# Patient Record
Sex: MALE | Race: WHITE | Employment: OTHER | URBAN - METROPOLITAN AREA
[De-identification: names, ages, dates, MRNs, and addresses within clinical notes are randomized per-mention and may not be internally consistent; named-entity substitution may affect disease eponyms.]

---

## 2017-02-04 ENCOUNTER — LAB ENCOUNTER (OUTPATIENT)
Dept: LAB | Facility: HOSPITAL | Age: 67
End: 2017-02-04
Attending: INTERNAL MEDICINE
Payer: COMMERCIAL

## 2017-02-04 ENCOUNTER — PRIOR ORIGINAL RECORDS (OUTPATIENT)
Dept: OTHER | Age: 67
End: 2017-02-04

## 2017-02-04 DIAGNOSIS — Z00.00 ROUTINE PHYSICAL EXAMINATION: ICD-10-CM

## 2017-02-04 LAB
25-HYDROXYVITAMIN D (TOTAL): 49.8 NG/ML (ref 30–100)
ALBUMIN SERPL-MCNC: 3.3 G/DL (ref 3.5–4.8)
ALP LIVER SERPL-CCNC: 78 U/L (ref 45–117)
ALT SERPL-CCNC: 29 U/L (ref 17–63)
AST SERPL-CCNC: 24 U/L (ref 15–41)
BASOPHILS # BLD AUTO: 0.05 X10(3) UL (ref 0–0.1)
BASOPHILS NFR BLD AUTO: 1.1 %
BILIRUB SERPL-MCNC: 0.8 MG/DL (ref 0.1–2)
BUN BLD-MCNC: 15 MG/DL (ref 8–20)
CALCIUM BLD-MCNC: 8.9 MG/DL (ref 8.3–10.3)
CHLORIDE: 107 MMOL/L (ref 101–111)
CHOLEST SMN-MCNC: 136 MG/DL (ref ?–200)
CO2: 30 MMOL/L (ref 22–32)
COMPLEXED PSA SERPL-MCNC: <0.01 NG/ML (ref 0.01–4)
CREAT BLD-MCNC: 1.03 MG/DL (ref 0.7–1.3)
EOSINOPHIL # BLD AUTO: 0.38 X10(3) UL (ref 0–0.3)
EOSINOPHIL NFR BLD AUTO: 8.2 %
ERYTHROCYTE [DISTWIDTH] IN BLOOD BY AUTOMATED COUNT: 13.8 % (ref 11.5–16)
FREE T4: 0.9 NG/DL (ref 0.9–1.8)
GLUCOSE BLD-MCNC: 96 MG/DL (ref 70–99)
HCT VFR BLD AUTO: 42.9 % (ref 37–53)
HDLC SERPL-MCNC: 47 MG/DL (ref 45–?)
HDLC SERPL: 2.89 {RATIO} (ref ?–4.97)
HGB BLD-MCNC: 14.1 G/DL (ref 13–17)
IMMATURE GRANULOCYTE COUNT: 0 X10(3) UL (ref 0–1)
IMMATURE GRANULOCYTE RATIO %: 0 %
LDLC SERPL CALC-MCNC: 77 MG/DL (ref ?–130)
LYMPHOCYTES # BLD AUTO: 1.08 X10(3) UL (ref 0.9–4)
LYMPHOCYTES NFR BLD AUTO: 23.3 %
M PROTEIN MFR SERPL ELPH: 6.8 G/DL (ref 6.1–8.3)
MCH RBC QN AUTO: 28.8 PG (ref 27–33.2)
MCHC RBC AUTO-ENTMCNC: 32.9 G/DL (ref 31–37)
MCV RBC AUTO: 87.6 FL (ref 80–99)
MONOCYTES # BLD AUTO: 0.54 X10(3) UL (ref 0.1–0.6)
MONOCYTES NFR BLD AUTO: 11.6 %
NEUTROPHIL ABS PRELIM: 2.59 X10 (3) UL (ref 1.3–6.7)
NEUTROPHILS # BLD AUTO: 2.59 X10(3) UL (ref 1.3–6.7)
NEUTROPHILS NFR BLD AUTO: 55.8 %
NONHDLC SERPL-MCNC: 89 MG/DL (ref ?–130)
PLATELET # BLD AUTO: 203 10(3)UL (ref 150–450)
POTASSIUM SERPL-SCNC: 4.2 MMOL/L (ref 3.6–5.1)
RBC # BLD AUTO: 4.9 X10(6)UL (ref 3.8–5.8)
RED CELL DISTRIBUTION WIDTH-SD: 44.4 FL (ref 35.1–46.3)
SODIUM SERPL-SCNC: 141 MMOL/L (ref 136–144)
TRIGLYCERIDES: 62 MG/DL (ref ?–150)
TSI SER-ACNC: 1.75 MIU/ML (ref 0.35–5.5)
VLDL: 12 MG/DL (ref 5–40)
WBC # BLD AUTO: 4.6 X10(3) UL (ref 4–13)

## 2017-02-04 PROCEDURE — 84439 ASSAY OF FREE THYROXINE: CPT

## 2017-02-04 PROCEDURE — 80053 COMPREHEN METABOLIC PANEL: CPT

## 2017-02-04 PROCEDURE — 82306 VITAMIN D 25 HYDROXY: CPT

## 2017-02-04 PROCEDURE — 84443 ASSAY THYROID STIM HORMONE: CPT

## 2017-02-04 PROCEDURE — 80061 LIPID PANEL: CPT

## 2017-02-04 PROCEDURE — 85025 COMPLETE CBC W/AUTO DIFF WBC: CPT

## 2017-02-04 PROCEDURE — 36415 COLL VENOUS BLD VENIPUNCTURE: CPT

## 2017-02-06 ENCOUNTER — PRIOR ORIGINAL RECORDS (OUTPATIENT)
Dept: OTHER | Age: 67
End: 2017-02-06

## 2017-02-06 LAB
ALBUMIN: 3.3 G/DL
ALKALINE PHOSPHATATE(ALK PHOS): 78 IU/L
ALT (SGPT): 29 U/L
AST (SGOT): 24 U/L
BILIRUBIN TOTAL: 0.8 MG/DL
BUN: 15 MG/DL
CALCIUM: 8.9 MG/DL
CHLORIDE: 107 MEQ/L
CHOLESTEROL, TOTAL: 136 MG/DL
CREATININE, SERUM: 1.03 MG/DL
GLUCOSE: 96 MG/DL
GLUCOSE: 96 MG/DL
HDL CHOLESTEROL: 47 MG/DL
LDL CHOLESTEROL: 77 MG/DL
NON-HDL CHOLESTEROL: 89 MG/DL
POTASSIUM, SERUM: 4.2 MEQ/L
PROTEIN, TOTAL: 6.8 G/DL
SGOT (AST): 24 IU/L
SGPT (ALT): 29 IU/L
SODIUM: 141 MEQ/L
TRIGLYCERIDES: 62 MG/DL

## 2017-02-08 ENCOUNTER — TELEPHONE (OUTPATIENT)
Dept: FAMILY MEDICINE CLINIC | Facility: CLINIC | Age: 67
End: 2017-02-08

## 2017-02-08 DIAGNOSIS — E78.00 PURE HYPERCHOLESTEROLEMIA: Primary | ICD-10-CM

## 2017-03-15 ENCOUNTER — PRIOR ORIGINAL RECORDS (OUTPATIENT)
Dept: OTHER | Age: 67
End: 2017-03-15

## 2017-05-09 ENCOUNTER — PRIOR ORIGINAL RECORDS (OUTPATIENT)
Dept: OTHER | Age: 67
End: 2017-05-09

## 2017-05-11 ENCOUNTER — HOSPITAL ENCOUNTER (OUTPATIENT)
Dept: CV DIAGNOSTICS | Facility: HOSPITAL | Age: 67
Discharge: HOME | End: 2017-05-11
Attending: INTERNAL MEDICINE
Payer: COMMERCIAL

## 2017-05-11 DIAGNOSIS — I25.10 CAD (CORONARY ARTERY DISEASE): ICD-10-CM

## 2017-05-11 DIAGNOSIS — R07.9 CHEST PAIN, UNSPECIFIED: ICD-10-CM

## 2017-05-11 PROCEDURE — 78452 HT MUSCLE IMAGE SPECT MULT: CPT | Performed by: INTERNAL MEDICINE

## 2017-05-11 PROCEDURE — 93017 CV STRESS TEST TRACING ONLY: CPT | Performed by: INTERNAL MEDICINE

## 2017-05-11 PROCEDURE — 93018 CV STRESS TEST I&R ONLY: CPT | Performed by: INTERNAL MEDICINE

## 2017-07-10 PROCEDURE — 88305 TISSUE EXAM BY PATHOLOGIST: CPT | Performed by: INTERNAL MEDICINE

## 2017-07-18 ENCOUNTER — PRIOR ORIGINAL RECORDS (OUTPATIENT)
Dept: OTHER | Age: 67
End: 2017-07-18

## 2017-07-28 ENCOUNTER — PRIOR ORIGINAL RECORDS (OUTPATIENT)
Dept: OTHER | Age: 67
End: 2017-07-28

## 2017-08-02 ENCOUNTER — PRIOR ORIGINAL RECORDS (OUTPATIENT)
Dept: OTHER | Age: 67
End: 2017-08-02

## 2017-12-27 ENCOUNTER — TELEPHONE (OUTPATIENT)
Dept: FAMILY MEDICINE CLINIC | Facility: CLINIC | Age: 67
End: 2017-12-27

## 2017-12-27 DIAGNOSIS — N52.31 ERECTILE DYSFUNCTION FOLLOWING RADICAL PROSTATECTOMY: ICD-10-CM

## 2017-12-27 DIAGNOSIS — C61 MALIGNANT NEOPLASM OF PROSTATE (HCC): ICD-10-CM

## 2017-12-27 RX ORDER — PROPOXYPHENE HYDROCHLORIDE AND ACETAMINOPHEN 65; 650 MG/1; MG/1
TABLET, FILM COATED ORAL
Qty: 6 EACH | Refills: 2 | Status: SHIPPED | OUTPATIENT
Start: 2017-12-27 | End: 2018-05-29

## 2017-12-27 RX ORDER — PROPOXYPHENE HYDROCHLORIDE AND ACETAMINOPHEN 65; 650 MG/1; MG/1
TABLET, FILM COATED ORAL
Qty: 6 EACH | Refills: 2 | Status: SHIPPED | OUTPATIENT
Start: 2017-12-27 | End: 2018-05-21

## 2017-12-27 NOTE — TELEPHONE ENCOUNTER
Pt states he contacted his pharmacy for a refill of MUSE and was told this   and pharmacy is faxing over a request for refill to our office. Pt relayed refill for this is for 2 different strengths: 500 mcg and 1000mcg.  Pt states he needs this r

## 2018-01-04 PROCEDURE — 82746 ASSAY OF FOLIC ACID SERUM: CPT | Performed by: INTERNAL MEDICINE

## 2018-01-04 PROCEDURE — 82607 VITAMIN B-12: CPT | Performed by: INTERNAL MEDICINE

## 2018-01-10 ENCOUNTER — PRIOR ORIGINAL RECORDS (OUTPATIENT)
Dept: OTHER | Age: 68
End: 2018-01-10

## 2018-01-11 ENCOUNTER — PRIOR ORIGINAL RECORDS (OUTPATIENT)
Dept: OTHER | Age: 68
End: 2018-01-11

## 2018-01-11 ENCOUNTER — TELEPHONE (OUTPATIENT)
Dept: FAMILY MEDICINE CLINIC | Facility: CLINIC | Age: 68
End: 2018-01-11

## 2018-01-11 NOTE — TELEPHONE ENCOUNTER
I have left detailed msg on cell letting pt know the high dose flu shots we have here (since pt over age 72) are preservative free. Asked to cb if he had further questions on this.

## 2018-01-11 NOTE — TELEPHONE ENCOUNTER
Patient is wanting to know where he can get a Flu Vaccine without preservatives. Please advise. Thank you.

## 2018-01-15 ENCOUNTER — NURSE ONLY (OUTPATIENT)
Dept: FAMILY MEDICINE CLINIC | Facility: CLINIC | Age: 68
End: 2018-01-15

## 2018-01-15 PROCEDURE — 90653 IIV ADJUVANT VACCINE IM: CPT | Performed by: FAMILY MEDICINE

## 2018-01-15 PROCEDURE — 90471 IMMUNIZATION ADMIN: CPT | Performed by: FAMILY MEDICINE

## 2018-05-19 DIAGNOSIS — N52.31 ERECTILE DYSFUNCTION FOLLOWING RADICAL PROSTATECTOMY: ICD-10-CM

## 2018-05-19 DIAGNOSIS — C61 MALIGNANT NEOPLASM OF PROSTATE (HCC): ICD-10-CM

## 2018-05-21 RX ORDER — ACEBUTOLOL HYDROCHLORIDE 400 MG/1
1000 CAPSULE ORAL AS NEEDED
Refills: 0 | OUTPATIENT
Start: 2018-05-21

## 2018-05-21 NOTE — TELEPHONE ENCOUNTER
Not a protocol medication last OV 12/20/16, last refill 12/27/17.   Please review and refill if appropriate, thank you

## 2018-05-24 ENCOUNTER — PRIOR ORIGINAL RECORDS (OUTPATIENT)
Dept: OTHER | Age: 68
End: 2018-05-24

## 2018-05-24 ENCOUNTER — LAB ENCOUNTER (OUTPATIENT)
Dept: LAB | Age: 68
End: 2018-05-24
Attending: INTERNAL MEDICINE
Payer: COMMERCIAL

## 2018-05-24 DIAGNOSIS — I25.10 CORONARY ATHEROSCLEROSIS OF NATIVE CORONARY ARTERY: ICD-10-CM

## 2018-05-24 DIAGNOSIS — E78.00 PURE HYPERCHOLESTEROLEMIA: Primary | ICD-10-CM

## 2018-05-24 PROCEDURE — 80061 LIPID PANEL: CPT

## 2018-05-24 PROCEDURE — 80053 COMPREHEN METABOLIC PANEL: CPT

## 2018-05-24 PROCEDURE — 36415 COLL VENOUS BLD VENIPUNCTURE: CPT

## 2018-05-24 PROCEDURE — 84153 ASSAY OF PSA TOTAL: CPT

## 2018-05-29 ENCOUNTER — OFFICE VISIT (OUTPATIENT)
Dept: FAMILY MEDICINE CLINIC | Facility: CLINIC | Age: 68
End: 2018-05-29

## 2018-05-29 VITALS
WEIGHT: 197 LBS | SYSTOLIC BLOOD PRESSURE: 128 MMHG | HEIGHT: 69 IN | DIASTOLIC BLOOD PRESSURE: 72 MMHG | RESPIRATION RATE: 16 BRPM | TEMPERATURE: 98 F | HEART RATE: 60 BPM | BODY MASS INDEX: 29.18 KG/M2

## 2018-05-29 DIAGNOSIS — C61 MALIGNANT NEOPLASM OF PROSTATE (HCC): ICD-10-CM

## 2018-05-29 DIAGNOSIS — Z13.89 SCREENING FOR GENITOURINARY CONDITION: ICD-10-CM

## 2018-05-29 DIAGNOSIS — I25.10 ATHEROSCLEROSIS OF NATIVE CORONARY ARTERY OF NATIVE HEART WITHOUT ANGINA PECTORIS: ICD-10-CM

## 2018-05-29 DIAGNOSIS — Z00.00 ANNUAL PHYSICAL EXAM: Primary | ICD-10-CM

## 2018-05-29 DIAGNOSIS — N52.31 ERECTILE DYSFUNCTION FOLLOWING RADICAL PROSTATECTOMY: ICD-10-CM

## 2018-05-29 DIAGNOSIS — E78.00 PURE HYPERCHOLESTEROLEMIA: ICD-10-CM

## 2018-05-29 PROCEDURE — 99397 PER PM REEVAL EST PAT 65+ YR: CPT | Performed by: FAMILY MEDICINE

## 2018-05-29 PROCEDURE — 81003 URINALYSIS AUTO W/O SCOPE: CPT | Performed by: FAMILY MEDICINE

## 2018-05-29 NOTE — PROGRESS NOTES
Consuelo Jiang is a 79year old male who presents for a complete physical exam.   HPI:   Pt has a history of coronary artery disease and prostate cancer. He had a total prostatectomy for his cancer. He states urination is good and bowel movements are good. 2 (two) times daily. Disp: 7.5 mL Rfl: 0   MetroNIDAZOLE 1 % External Gel Use as directed Disp:  Rfl: 99   omega-3 fatty acids (FISH OIL) 1000 MG Oral Cap Take 4,000 mg by mouth daily. Disp:  Rfl:    BABY ASPIRIN OR Take 1 tablet by mouth.  Disp:  Rfl:    C stream  MUSCULOSKELETAL: denies back pain  NEURO: denies headaches  PSYCHE: denies depression or anxiety  HEMATOLOGIC: denies hx of anemia  ENDOCRINE: denies thyroid history  ALL/ASTHMA: denies hx of allergy or asthma    EXAM:   /72   Pulse 60   Temp Vasodilator, (MUSE) 500 MCG Urethral Pellet 12 each 1      Sig: USE AS DIRECTED           Imaging & Consults:  None

## 2018-07-18 ENCOUNTER — MYAURORA ACCOUNT LINK (OUTPATIENT)
Dept: OTHER | Age: 68
End: 2018-07-18

## 2018-07-18 ENCOUNTER — PRIOR ORIGINAL RECORDS (OUTPATIENT)
Dept: OTHER | Age: 68
End: 2018-07-18

## 2018-07-18 LAB
ALBUMIN: 3.5 G/DL
ALT (SGPT): 26 U/L
AST (SGOT): 21 U/L
BILIRUBIN TOTAL: 0.7 MG/DL
BUN: 87 MG/DL
CALCIUM: 9.6 MG/DL
CHLORIDE: 109 MEQ/L
CHOLESTEROL, TOTAL: 136 MG/DL
CREATININE, SERUM: 0.96 MG/DL
GLOBULIN: 67 G/DL
GLUCOSE: 87 MG/DL
GLUCOSE: 87 MG/DL
HDL CHOLESTEROL: 40 MG/DL
LDL CHOLESTEROL: 82 MG/DL
NON-HDL CHOLESTEROL: 96 MG/DL
POTASSIUM, SERUM: 4.1 MEQ/L
PROTEIN, TOTAL: 6.8 G/DL
SGOT (AST): 21 IU/L
SGPT (ALT): 26 IU/L
SODIUM: 142 MEQ/L
TRIGLYCERIDES: 68 MG/DL

## 2018-08-20 DIAGNOSIS — C61 MALIGNANT NEOPLASM OF PROSTATE (HCC): ICD-10-CM

## 2018-08-20 DIAGNOSIS — N52.31 ERECTILE DYSFUNCTION FOLLOWING RADICAL PROSTATECTOMY: ICD-10-CM

## 2018-08-20 NOTE — TELEPHONE ENCOUNTER
Fax from [employer].  Requesting 90 day supply  Fax states 1 box of 6 suppositories is 1 month. Not on protocol. Last seen for px in may.

## 2018-10-17 ENCOUNTER — PRIOR ORIGINAL RECORDS (OUTPATIENT)
Dept: OTHER | Age: 68
End: 2018-10-17

## 2018-11-26 DIAGNOSIS — C61 MALIGNANT NEOPLASM OF PROSTATE (HCC): ICD-10-CM

## 2018-11-26 NOTE — TELEPHONE ENCOUNTER
Pt calling asking for a year supply of this med, I went over how it was sent in May. The 1000 MCG is on a manufacture back order.  Please advise    [phone]

## 2018-11-26 NOTE — TELEPHONE ENCOUNTER
PT NEEDS A 90 DAY SUPPLY PLS REFILLS FOR A YR. THE MUSE 1000 IS STILL ON MANUF. BACK ORDER WITH NO RELEASE.  PLS SEND SEND ASAP. PT IS COMPLETELY OUT.

## 2019-01-08 LAB
CHOLESTEROL, TOTAL: 143 MG/DL
GLUCOSE: 85 MG/DL
HDL CHOLESTEROL: 37 MG/DL
LDL CHOLESTEROL: 94 MG/DL
THYROID STIMULATING HORMONE: 1.24 MLU/L
TRIGLYCERIDES: 62 MG/DL

## 2019-01-10 ENCOUNTER — PRIOR ORIGINAL RECORDS (OUTPATIENT)
Dept: OTHER | Age: 69
End: 2019-01-10

## 2019-01-18 ENCOUNTER — PRIOR ORIGINAL RECORDS (OUTPATIENT)
Dept: OTHER | Age: 69
End: 2019-01-18

## 2019-01-18 ENCOUNTER — MYAURORA ACCOUNT LINK (OUTPATIENT)
Dept: OTHER | Age: 69
End: 2019-01-18

## 2019-02-06 ENCOUNTER — HOSPITAL ENCOUNTER (OUTPATIENT)
Dept: CV DIAGNOSTICS | Facility: HOSPITAL | Age: 69
Discharge: HOME | End: 2019-02-06
Attending: INTERNAL MEDICINE
Payer: COMMERCIAL

## 2019-02-06 DIAGNOSIS — R00.1 BRADYCARDIA: ICD-10-CM

## 2019-02-06 PROCEDURE — 93226 XTRNL ECG REC<48 HR SCAN A/R: CPT | Performed by: INTERNAL MEDICINE

## 2019-02-06 PROCEDURE — 93225 XTRNL ECG REC<48 HRS REC: CPT | Performed by: INTERNAL MEDICINE

## 2019-02-06 PROCEDURE — 93227 XTRNL ECG REC<48 HR R&I: CPT | Performed by: INTERNAL MEDICINE

## 2019-02-14 ENCOUNTER — PRIOR ORIGINAL RECORDS (OUTPATIENT)
Dept: OTHER | Age: 69
End: 2019-02-14

## 2019-02-17 DIAGNOSIS — C61 MALIGNANT NEOPLASM OF PROSTATE (HCC): ICD-10-CM

## 2019-02-17 DIAGNOSIS — N52.31 ERECTILE DYSFUNCTION FOLLOWING RADICAL PROSTATECTOMY: ICD-10-CM

## 2019-02-18 ENCOUNTER — TELEPHONE (OUTPATIENT)
Dept: FAMILY MEDICINE CLINIC | Facility: CLINIC | Age: 69
End: 2019-02-18

## 2019-02-18 DIAGNOSIS — Z85.9 HISTORY OF MALIGNANT NEOPLASM: Primary | ICD-10-CM

## 2019-02-18 RX ORDER — ACEBUTOLOL HYDROCHLORIDE 400 MG/1
CAPSULE ORAL
Qty: 18 EACH | Refills: 3 | Status: SHIPPED | OUTPATIENT
Start: 2019-02-18 | End: 2020-03-09

## 2019-02-18 NOTE — TELEPHONE ENCOUNTER
Pt has a scheduled physical appt with Dr Enmanuel Card. Pt is aware there's blood work for him to complete but he would like a PSA test to be ordered as well. Please call pt back to let him know when the test is in the system.

## 2019-02-18 NOTE — TELEPHONE ENCOUNTER
ABIGAIL Pt. Notified PSA was ordered. Pt aware last one was 05/24/2018. He may want to wait until after that date to do it for insurance reasons. Any questions call insurance to verify coverage.

## 2019-02-18 NOTE — TELEPHONE ENCOUNTER
Pt scheduled a physical with Dr Michelet Hyde on 5/31/19. Pt does not want to come in twice (physical and med check). Please refill if ok.     Also pt would like to get a refill for the whole year so that he doesn't run out of this prescription since he needs it perm

## 2019-02-28 VITALS
HEART RATE: 64 BPM | SYSTOLIC BLOOD PRESSURE: 100 MMHG | WEIGHT: 193 LBS | BODY MASS INDEX: 28.58 KG/M2 | DIASTOLIC BLOOD PRESSURE: 64 MMHG | HEIGHT: 69 IN

## 2019-03-01 VITALS
BODY MASS INDEX: 30.21 KG/M2 | WEIGHT: 204 LBS | DIASTOLIC BLOOD PRESSURE: 70 MMHG | HEART RATE: 59 BPM | HEIGHT: 69 IN | SYSTOLIC BLOOD PRESSURE: 110 MMHG

## 2019-03-11 RX ORDER — ECHINACEA 400 MG
CAPSULE ORAL
COMMUNITY
Start: 2013-02-20

## 2019-03-11 RX ORDER — METOPROLOL SUCCINATE 25 MG/1
1 TABLET, EXTENDED RELEASE ORAL DAILY
COMMUNITY
Start: 2019-01-18 | End: 2019-09-23 | Stop reason: SDUPTHER

## 2019-03-11 RX ORDER — CHLORAL HYDRATE 500 MG
2 CAPSULE ORAL 2 TIMES DAILY
COMMUNITY
Start: 2009-07-01 | End: 2020-04-20 | Stop reason: ALTCHOICE

## 2019-03-11 RX ORDER — ATORVASTATIN CALCIUM 20 MG/1
TABLET, FILM COATED ORAL
COMMUNITY
Start: 2018-07-18 | End: 2019-04-10 | Stop reason: DRUGHIGH

## 2019-03-11 RX ORDER — MULTIVITAMIN
1 CAPSULE ORAL DAILY
COMMUNITY
Start: 2009-07-01

## 2019-04-07 LAB
ALBUMIN SERPL-MCNC: 4 G/DL (ref 3.6–5.1)
ALBUMIN/GLOB SERPL: 1.7 (CALC) (ref 1–2.5)
ALP SERPL-CCNC: 62 U/L (ref 40–115)
ALT SERPL-CCNC: 17 U/L (ref 9–46)
AST SERPL-CCNC: 22 U/L (ref 10–35)
BILIRUB SERPL-MCNC: 0.9 MG/DL (ref 0.2–1.2)
BUN SERPL-MCNC: 20 MG/DL (ref 7–25)
BUN/CREAT SERPL: NORMAL (CALC) (ref 6–22)
CALCIUM SERPL-MCNC: 9.6 MG/DL (ref 8.6–10.3)
CHLORIDE SERPL-SCNC: 106 MMOL/L (ref 98–110)
CHOLEST SERPL-MCNC: 159 MG/DL
CHOLEST/HDLC SERPL: 3.8 (CALC)
CO2 SERPL-SCNC: 26 MMOL/L (ref 20–32)
CREAT SERPL-MCNC: 1.06 MG/DL (ref 0.7–1.25)
GFRSERPLBLD MDRD-ARVRAT: 72 ML/MIN/1.73M2
GLOBULIN SER CALC-MCNC: 2.3 G/DL (CALC) (ref 1.9–3.7)
GLUCOSE SERPL-MCNC: 85 MG/DL (ref 65–99)
HDLC SERPL-MCNC: 42 MG/DL
LDLC SERPL CALC-MCNC: 101 MG/DL (CALC)
NONHDLC SERPL-MCNC: 117 MG/DL (CALC)
POTASSIUM SERPL-SCNC: 4.2 MMOL/L (ref 3.5–5.3)
PROT SERPL-MCNC: 6.3 G/DL (ref 6.1–8.1)
PSA SERPL-MCNC: <0.1 NG/ML
SODIUM SERPL-SCNC: 140 MMOL/L (ref 135–146)
T4 FREE SERPL-MCNC: 1.1 NG/DL (ref 0.8–1.8)
TRIGL SERPL-MCNC: 69 MG/DL

## 2019-04-08 ENCOUNTER — TELEPHONE (OUTPATIENT)
Dept: FAMILY MEDICINE CLINIC | Facility: CLINIC | Age: 69
End: 2019-04-08

## 2019-04-10 ENCOUNTER — OFFICE VISIT (OUTPATIENT)
Dept: CARDIOLOGY | Age: 69
End: 2019-04-10

## 2019-04-10 VITALS
HEART RATE: 58 BPM | BODY MASS INDEX: 28.58 KG/M2 | HEIGHT: 69 IN | DIASTOLIC BLOOD PRESSURE: 62 MMHG | WEIGHT: 193 LBS | SYSTOLIC BLOOD PRESSURE: 124 MMHG

## 2019-04-10 DIAGNOSIS — I25.10 CAD IN NATIVE ARTERY: Primary | Chronic | ICD-10-CM

## 2019-04-10 DIAGNOSIS — I44.1 MOBITZ TYPE 1 SECOND DEGREE AV BLOCK: ICD-10-CM

## 2019-04-10 DIAGNOSIS — E78.00 PURE HYPERCHOLESTEROLEMIA: ICD-10-CM

## 2019-04-10 DIAGNOSIS — Z95.1 HX OF CABG: ICD-10-CM

## 2019-04-10 PROCEDURE — 93000 ELECTROCARDIOGRAM COMPLETE: CPT | Performed by: INTERNAL MEDICINE

## 2019-04-10 PROCEDURE — 99215 OFFICE O/P EST HI 40 MIN: CPT | Performed by: INTERNAL MEDICINE

## 2019-04-10 RX ORDER — UBIDECARENONE 50 MG
50 CAPSULE ORAL 2 TIMES DAILY
COMMUNITY
End: 2020-04-20 | Stop reason: ALTCHOICE

## 2019-04-10 RX ORDER — OMEGA-3 FATTY ACIDS/FISH OIL 300-1000MG
500 CAPSULE ORAL 2 TIMES DAILY
COMMUNITY

## 2019-04-10 RX ORDER — ATORVASTATIN CALCIUM 40 MG/1
40 TABLET, FILM COATED ORAL DAILY
Qty: 90 TABLET | Refills: 1 | Status: SHIPPED | OUTPATIENT
Start: 2019-04-10 | End: 2020-04-20 | Stop reason: ALTCHOICE

## 2019-04-10 RX ORDER — ATORVASTATIN CALCIUM 40 MG/1
40 TABLET, FILM COATED ORAL DAILY
COMMUNITY
End: 2019-04-10 | Stop reason: SDUPTHER

## 2019-04-10 ASSESSMENT — ENCOUNTER SYMPTOMS
COUGH: 0
SUSPICIOUS LESIONS: 0
HEMATOCHEZIA: 0
WEIGHT GAIN: 0
ALLERGIC/IMMUNOLOGIC COMMENTS: NO NEW FOOD ALLERGIES
WEIGHT LOSS: 0
FEVER: 0
HEMOPTYSIS: 0
CHILLS: 0
BRUISES/BLEEDS EASILY: 0

## 2019-04-10 ASSESSMENT — PATIENT HEALTH QUESTIONNAIRE - PHQ9
1. LITTLE INTEREST OR PLEASURE IN DOING THINGS: NOT AT ALL
SUM OF ALL RESPONSES TO PHQ9 QUESTIONS 1 AND 2: 0
2. FEELING DOWN, DEPRESSED OR HOPELESS: NOT AT ALL
SUM OF ALL RESPONSES TO PHQ9 QUESTIONS 1 AND 2: 0

## 2019-04-30 ENCOUNTER — TELEPHONE (OUTPATIENT)
Dept: CARDIOLOGY | Age: 69
End: 2019-04-30

## 2019-05-01 ENCOUNTER — TELEPHONE (OUTPATIENT)
Dept: CARDIOLOGY | Age: 69
End: 2019-05-01

## 2019-05-16 ENCOUNTER — LAB ENCOUNTER (OUTPATIENT)
Dept: LAB | Age: 69
End: 2019-05-16
Attending: PHYSICIAN ASSISTANT
Payer: COMMERCIAL

## 2019-05-16 DIAGNOSIS — H73.001 ACUTE MYRINGITIS OF EAR, RIGHT: ICD-10-CM

## 2019-05-16 PROCEDURE — 87147 CULTURE TYPE IMMUNOLOGIC: CPT

## 2019-05-16 PROCEDURE — 87205 SMEAR GRAM STAIN: CPT

## 2019-05-16 PROCEDURE — 87077 CULTURE AEROBIC IDENTIFY: CPT

## 2019-05-16 PROCEDURE — 87070 CULTURE OTHR SPECIMN AEROBIC: CPT

## 2019-05-20 NOTE — PROGRESS NOTES
Please inform no significant growth of bacteria/fungal growth therefore continue with current regimen of drops and see Dr Patrick Briceño later this week please

## 2019-05-22 NOTE — PROGRESS NOTES
Pt notified. Pt is asking for appt this Friday or Tuesday with PA. Advise pt need to see the Dr. Perlie Claude said he is traveling and will be in town on Friday 5/24 and Tuesday 5/28.  Asking for appt , no appt available with NP or . Please advise

## 2019-05-29 RX ORDER — ATORVASTATIN CALCIUM 40 MG/1
40 TABLET, FILM COATED ORAL DAILY
Qty: 90 TABLET | Refills: 1 | OUTPATIENT
Start: 2019-05-29

## 2019-05-31 ENCOUNTER — OFFICE VISIT (OUTPATIENT)
Dept: FAMILY MEDICINE CLINIC | Facility: CLINIC | Age: 69
End: 2019-05-31
Payer: COMMERCIAL

## 2019-05-31 VITALS
DIASTOLIC BLOOD PRESSURE: 74 MMHG | HEIGHT: 69 IN | RESPIRATION RATE: 14 BRPM | WEIGHT: 192 LBS | BODY MASS INDEX: 28.44 KG/M2 | HEART RATE: 74 BPM | SYSTOLIC BLOOD PRESSURE: 122 MMHG | TEMPERATURE: 98 F

## 2019-05-31 DIAGNOSIS — C61 MALIGNANT NEOPLASM OF PROSTATE (HCC): ICD-10-CM

## 2019-05-31 DIAGNOSIS — I25.10 ATHEROSCLEROSIS OF NATIVE CORONARY ARTERY OF NATIVE HEART WITHOUT ANGINA PECTORIS: ICD-10-CM

## 2019-05-31 DIAGNOSIS — N52.31 ERECTILE DYSFUNCTION FOLLOWING RADICAL PROSTATECTOMY: ICD-10-CM

## 2019-05-31 DIAGNOSIS — E78.00 PURE HYPERCHOLESTEROLEMIA: ICD-10-CM

## 2019-05-31 DIAGNOSIS — Z00.00 ANNUAL PHYSICAL EXAM: Primary | ICD-10-CM

## 2019-05-31 PROCEDURE — 99397 PER PM REEVAL EST PAT 65+ YR: CPT | Performed by: FAMILY MEDICINE

## 2019-05-31 NOTE — PROGRESS NOTES
Roro Cummins is a 76year old male who presents for a complete physical exam.   HPI:   Pt has a history of coronary artery disease and prostate cancer. He had a total prostatectomy for his cancer. He continues to follow-up with urology.   He states urinat days Disp: 22 g Rfl: 0   Metoprolol Succinate ER 50 MG Oral Tablet 24 Hr Take 1 tablet (50 mg total) by mouth daily. Disp: 90 tablet Rfl: 1   Atorvastatin Calcium 20 MG Oral Tab Take 1 tablet (20 mg total) by mouth daily.  Disp: 90 tablet Rfl: 1   ciproflox well otherwise  SKIN: denies any unusual skin lesions  EYES:denies blurred vision or double vision  HEENT: denies nasal congestion, sinus pain or ST  LUNGS: denies shortness of breath with exertion  CARDIOVASCULAR: denies chest pain on exertion  GI: denies Metabolic Panel (14) [E]      Lipid Panel [E]      Meds & Refills for this Visit:  Requested Prescriptions      No prescriptions requested or ordered in this encounter       Imaging & Consults:  None

## 2019-06-28 ENCOUNTER — MED REC SCAN ONLY (OUTPATIENT)
Dept: FAMILY MEDICINE CLINIC | Facility: CLINIC | Age: 69
End: 2019-06-28

## 2019-08-22 RX ORDER — ATORVASTATIN CALCIUM 20 MG/1
20 TABLET, FILM COATED ORAL DAILY
Qty: 90 TABLET | Refills: 2 | Status: SHIPPED | OUTPATIENT
Start: 2019-08-22 | End: 2020-05-12

## 2019-08-31 ENCOUNTER — LAB ENCOUNTER (OUTPATIENT)
Dept: LAB | Facility: HOSPITAL | Age: 69
End: 2019-08-31
Payer: COMMERCIAL

## 2019-08-31 DIAGNOSIS — I25.10 ATHEROSCLEROSIS OF NATIVE CORONARY ARTERY OF NATIVE HEART WITHOUT ANGINA PECTORIS: Primary | ICD-10-CM

## 2019-08-31 DIAGNOSIS — E78.00 PURE HYPERCHOLESTEROLEMIA: ICD-10-CM

## 2019-08-31 DIAGNOSIS — I25.10 CORONARY ATHEROSCLEROSIS OF NATIVE CORONARY ARTERY: Primary | ICD-10-CM

## 2019-08-31 DIAGNOSIS — I25.10 ATHEROSCLEROSIS OF NATIVE CORONARY ARTERY OF NATIVE HEART WITHOUT ANGINA PECTORIS: ICD-10-CM

## 2019-08-31 DIAGNOSIS — Z85.9 HISTORY OF MALIGNANT NEOPLASM: ICD-10-CM

## 2019-08-31 LAB
ALBUMIN SERPL-MCNC: 3.5 G/DL (ref 3.4–5)
ALBUMIN/GLOB SERPL: 1.1 {RATIO} (ref 1–2)
ALP LIVER SERPL-CCNC: 80 U/L (ref 45–117)
ALT SERPL-CCNC: 33 U/L (ref 16–61)
ANION GAP SERPL CALC-SCNC: 3 MMOL/L (ref 0–18)
AST SERPL-CCNC: 24 U/L (ref 15–37)
BASOPHILS # BLD AUTO: 0.03 X10(3) UL (ref 0–0.2)
BASOPHILS NFR BLD AUTO: 0.7 %
BILIRUB SERPL-MCNC: 0.4 MG/DL (ref 0.1–2)
BUN BLD-MCNC: 17 MG/DL (ref 7–18)
BUN/CREAT SERPL: 18.9 (ref 10–20)
CALCIUM BLD-MCNC: 9.5 MG/DL (ref 8.5–10.1)
CHLORIDE SERPL-SCNC: 109 MMOL/L (ref 98–112)
CHOLEST SMN-MCNC: 134 MG/DL (ref ?–200)
CO2 SERPL-SCNC: 29 MMOL/L (ref 21–32)
CREAT BLD-MCNC: 0.9 MG/DL (ref 0.7–1.3)
CREAT UR-SCNC: 256 MG/DL
DEPRECATED RDW RBC AUTO: 47.7 FL (ref 35.1–46.3)
EOSINOPHIL # BLD AUTO: 0.26 X10(3) UL (ref 0–0.7)
EOSINOPHIL NFR BLD AUTO: 5.9 %
ERYTHROCYTE [DISTWIDTH] IN BLOOD BY AUTOMATED COUNT: 13.8 % (ref 11–15)
EST. AVERAGE GLUCOSE BLD GHB EST-MCNC: 120 MG/DL (ref 68–126)
GLOBULIN PLAS-MCNC: 3.1 G/DL (ref 2.8–4.4)
GLUCOSE BLD-MCNC: 94 MG/DL (ref 70–99)
HBA1C MFR BLD HPLC: 5.8 % (ref ?–5.7)
HCT VFR BLD AUTO: 44.1 % (ref 39–53)
HDLC SERPL-MCNC: 39 MG/DL (ref 40–59)
HGB BLD-MCNC: 14.6 G/DL (ref 13–17.5)
IMM GRANULOCYTES # BLD AUTO: 0.01 X10(3) UL (ref 0–1)
IMM GRANULOCYTES NFR BLD: 0.2 %
IRON SERPL-MCNC: 69 UG/DL (ref 65–175)
LDLC SERPL CALC-MCNC: 84 MG/DL (ref ?–100)
LYMPHOCYTES # BLD AUTO: 0.86 X10(3) UL (ref 1–4)
LYMPHOCYTES NFR BLD AUTO: 19.5 %
M PROTEIN MFR SERPL ELPH: 6.6 G/DL (ref 6.4–8.2)
MCH RBC QN AUTO: 30.9 PG (ref 26–34)
MCHC RBC AUTO-ENTMCNC: 33.1 G/DL (ref 31–37)
MCV RBC AUTO: 93.2 FL (ref 80–100)
MICROALBUMIN UR-MCNC: 1.91 MG/DL
MICROALBUMIN/CREAT 24H UR-RTO: 7.5 UG/MG (ref ?–30)
MONOCYTES # BLD AUTO: 0.44 X10(3) UL (ref 0.1–1)
MONOCYTES NFR BLD AUTO: 10 %
NEUTROPHILS # BLD AUTO: 2.81 X10 (3) UL (ref 1.5–7.7)
NEUTROPHILS # BLD AUTO: 2.81 X10(3) UL (ref 1.5–7.7)
NEUTROPHILS NFR BLD AUTO: 63.7 %
NONHDLC SERPL-MCNC: 95 MG/DL (ref ?–130)
OSMOLALITY SERPL CALC.SUM OF ELEC: 293 MOSM/KG (ref 275–295)
PLATELET # BLD AUTO: 200 10(3)UL (ref 150–450)
POTASSIUM SERPL-SCNC: 4.1 MMOL/L (ref 3.5–5.1)
PSA SERPL-MCNC: <0.01 NG/ML (ref ?–4)
RBC # BLD AUTO: 4.73 X10(6)UL (ref 3.8–5.8)
SODIUM SERPL-SCNC: 141 MMOL/L (ref 136–145)
T4 FREE SERPL-MCNC: 0.9 NG/DL (ref 0.8–1.7)
TRIGL SERPL-MCNC: 54 MG/DL (ref 30–149)
TSI SER-ACNC: 1.68 MIU/ML (ref 0.36–3.74)
VLDLC SERPL CALC-MCNC: 11 MG/DL (ref 0–30)
WBC # BLD AUTO: 4.4 X10(3) UL (ref 4–11)

## 2019-08-31 PROCEDURE — 82043 UR ALBUMIN QUANTITATIVE: CPT

## 2019-08-31 PROCEDURE — 36415 COLL VENOUS BLD VENIPUNCTURE: CPT

## 2019-08-31 PROCEDURE — 80061 LIPID PANEL: CPT

## 2019-08-31 PROCEDURE — 80053 COMPREHEN METABOLIC PANEL: CPT

## 2019-08-31 PROCEDURE — 84439 ASSAY OF FREE THYROXINE: CPT

## 2019-08-31 PROCEDURE — 85025 COMPLETE CBC W/AUTO DIFF WBC: CPT

## 2019-08-31 PROCEDURE — 84153 ASSAY OF PSA TOTAL: CPT

## 2019-08-31 PROCEDURE — 82570 ASSAY OF URINE CREATININE: CPT

## 2019-08-31 PROCEDURE — 83540 ASSAY OF IRON: CPT

## 2019-08-31 PROCEDURE — 83036 HEMOGLOBIN GLYCOSYLATED A1C: CPT

## 2019-08-31 PROCEDURE — 84443 ASSAY THYROID STIM HORMONE: CPT

## 2019-09-03 DIAGNOSIS — E78.00 PURE HYPERCHOLESTEROLEMIA: Primary | ICD-10-CM

## 2019-09-23 RX ORDER — METOPROLOL SUCCINATE 25 MG/1
25 TABLET, EXTENDED RELEASE ORAL DAILY
Qty: 90 TABLET | Refills: 1 | Status: SHIPPED | OUTPATIENT
Start: 2019-09-23 | End: 2020-03-18

## 2019-09-23 RX ORDER — METOPROLOL SUCCINATE 50 MG/1
TABLET, EXTENDED RELEASE ORAL
Qty: 90 TABLET | Refills: 1 | OUTPATIENT
Start: 2019-09-23

## 2019-09-23 RX ORDER — METOPROLOL SUCCINATE 25 MG/1
25 TABLET, EXTENDED RELEASE ORAL DAILY
Qty: 90 TABLET | Refills: 1 | Status: SHIPPED | OUTPATIENT
Start: 2019-09-23 | End: 2019-09-23 | Stop reason: SDUPTHER

## 2019-11-25 ENCOUNTER — TELEPHONE (OUTPATIENT)
Dept: FAMILY MEDICINE CLINIC | Facility: CLINIC | Age: 69
End: 2019-11-25

## 2019-11-25 NOTE — TELEPHONE ENCOUNTER
Pt wants a prescription for  Sildenafill gudelia 100 mg sent to [facility] in place of the Muse that he usually gets. If this is ok please let me know. This will need to be called in to [address].

## 2019-11-25 NOTE — TELEPHONE ENCOUNTER
Patient usually takes Muse. Patient states that [facility] makes another medication   100 mg sildenafill gudelia    He is asking our office to call Myra Garibay [phone] at [facility] to place an order for this medication.     I explained proc

## 2019-11-25 NOTE — TELEPHONE ENCOUNTER
I spoke with Babak Ying at [facility] and he said it does come in a 50 mg but he said it does  not work very well. His recommendation is 100 mg sildenafill gudelia 1 every 3 days. What quantity do you want to authorize?   If ok send it back to the call

## 2019-12-03 ENCOUNTER — TELEPHONE (OUTPATIENT)
Dept: FAMILY MEDICINE CLINIC | Facility: CLINIC | Age: 69
End: 2019-12-03

## 2019-12-03 DIAGNOSIS — C61 MALIGNANT NEOPLASM OF PROSTATE (HCC): ICD-10-CM

## 2019-12-03 NOTE — TELEPHONE ENCOUNTER
LONNIE to Clarice Phoenix at [facility]. [place] Pharmacist stated  Pt. Has decided to go ahead with the prescription for Sildenafil gudelia 100 mg 1 every 3 days as needed. # 30 0 refills.

## 2019-12-03 NOTE — TELEPHONE ENCOUNTER
Pt asking for refill of MUSE 1000 MCG Urethral Pellet and MUSE 500 MCG. Pt [facility]    [facility] 500 MCG [facility] . Pt uses whichever dose he may need.

## 2020-01-10 ENCOUNTER — TELEPHONE (OUTPATIENT)
Dept: FAMILY MEDICINE CLINIC | Facility: CLINIC | Age: 70
End: 2020-01-10

## 2020-01-10 DIAGNOSIS — Z13.89 SCREENING FOR GENITOURINARY CONDITION: ICD-10-CM

## 2020-01-10 DIAGNOSIS — C61 MALIGNANT NEOPLASM OF PROSTATE (HCC): ICD-10-CM

## 2020-01-10 DIAGNOSIS — Z00.00 LABORATORY EXAMINATION ORDERED AS PART OF A ROUTINE GENERAL MEDICAL EXAMINATION: Primary | ICD-10-CM

## 2020-01-10 NOTE — TELEPHONE ENCOUNTER
Pt is requesting to have his fasting labs for his physical be placed before his appt on 01/17/2020. ..       Please see pended labs for approval.

## 2020-02-19 ENCOUNTER — TELEPHONE (OUTPATIENT)
Dept: CARDIOLOGY | Age: 70
End: 2020-02-19

## 2020-03-02 ENCOUNTER — OFFICE VISIT (OUTPATIENT)
Dept: PODIATRY CLINIC | Facility: CLINIC | Age: 70
End: 2020-03-02
Payer: MEDICARE

## 2020-03-02 ENCOUNTER — TELEPHONE (OUTPATIENT)
Dept: CARDIOLOGY | Age: 70
End: 2020-03-02

## 2020-03-02 DIAGNOSIS — M79.672 BILATERAL FOOT PAIN: Primary | ICD-10-CM

## 2020-03-02 DIAGNOSIS — M76.61 ACHILLES TENDINITIS OF BOTH LOWER EXTREMITIES: ICD-10-CM

## 2020-03-02 DIAGNOSIS — M79.671 BILATERAL FOOT PAIN: Primary | ICD-10-CM

## 2020-03-02 DIAGNOSIS — M76.62 ACHILLES TENDINITIS OF BOTH LOWER EXTREMITIES: ICD-10-CM

## 2020-03-02 PROCEDURE — 99203 OFFICE O/P NEW LOW 30 MIN: CPT | Performed by: PODIATRIST

## 2020-03-03 NOTE — PROGRESS NOTES
Celena Browning is a 71year old male. Patient presents with:  New Patient: Bilateral foot pain, right foot is worse. Patient had a podiatrist in the past and was told he has bone spurs. 0-6/10 for pain. No recent imaging has been done.          HPI:     Charisse SURGICAL HISTORY  01    prostate surgery   • SINUS SURGERY    97    endoscopy w polypectomy   • TONSILLECTOMY  64   • VASECTOMY  80      Family History   Problem Relation Age of Onset   • Heart Attack Father    • Hypertension Mother       Social History for this visit:    Bilateral foot pain  -     XR FOOT WEIGHTBEARING (2 VIEWS), LEFT   (CPT=76869)  -     XR FOOT WEIGHTBEARING (2 VIEWS), RIGHT   (CPT=84526)  -     PHYSICAL THERAPY EXTERNAL    Achilles tendinitis of both lower extremities  -     PHYSICAL

## 2020-03-04 ENCOUNTER — HOSPITAL ENCOUNTER (OUTPATIENT)
Dept: CV DIAGNOSTICS | Facility: HOSPITAL | Age: 70
Discharge: HOME | End: 2020-03-04
Attending: INTERNAL MEDICINE
Payer: MEDICARE

## 2020-03-04 DIAGNOSIS — I44.1 MOBITZ TYPE 1 SECOND DEGREE ATRIOVENTRICULAR BLOCK: ICD-10-CM

## 2020-03-04 DIAGNOSIS — Z95.1 HX OF CABG: ICD-10-CM

## 2020-03-04 DIAGNOSIS — I25.10 CAD IN NATIVE ARTERY: ICD-10-CM

## 2020-03-04 PROCEDURE — 93017 CV STRESS TEST TRACING ONLY: CPT | Performed by: INTERNAL MEDICINE

## 2020-03-04 PROCEDURE — 93018 CV STRESS TEST I&R ONLY: CPT | Performed by: INTERNAL MEDICINE

## 2020-03-04 PROCEDURE — 93350 STRESS TTE ONLY: CPT | Performed by: INTERNAL MEDICINE

## 2020-03-05 ENCOUNTER — LAB ENCOUNTER (OUTPATIENT)
Dept: LAB | Facility: HOSPITAL | Age: 70
End: 2020-03-05
Attending: FAMILY MEDICINE
Payer: MEDICARE

## 2020-03-05 ENCOUNTER — TELEPHONE (OUTPATIENT)
Dept: CARDIOLOGY | Age: 70
End: 2020-03-05

## 2020-03-05 DIAGNOSIS — C61 MALIGNANT NEOPLASM OF PROSTATE (HCC): ICD-10-CM

## 2020-03-05 DIAGNOSIS — Z00.00 ANNUAL PHYSICAL EXAM: ICD-10-CM

## 2020-03-05 DIAGNOSIS — E61.1 IRON DEFICIENCY: Primary | ICD-10-CM

## 2020-03-05 DIAGNOSIS — E78.5 HYPERLIPEMIA: ICD-10-CM

## 2020-03-05 DIAGNOSIS — Z13.89 SCREENING FOR GENITOURINARY CONDITION: ICD-10-CM

## 2020-03-05 DIAGNOSIS — Z00.00 LABORATORY EXAMINATION ORDERED AS PART OF A ROUTINE GENERAL MEDICAL EXAMINATION: ICD-10-CM

## 2020-03-05 DIAGNOSIS — R53.82 CHRONIC FATIGUE SYNDROME: ICD-10-CM

## 2020-03-05 DIAGNOSIS — E78.00 PURE HYPERCHOLESTEROLEMIA: ICD-10-CM

## 2020-03-05 LAB
ALBUMIN SERPL-MCNC: 3.6 G/DL
ALBUMIN SERPL-MCNC: 3.6 G/DL (ref 3.4–5)
ALBUMIN/GLOB SERPL: 1.1 {RATIO}
ALBUMIN/GLOB SERPL: 1.1 {RATIO} (ref 1–2)
ALP LIVER SERPL-CCNC: 68 U/L (ref 45–117)
ALP SERPL-CCNC: 68 U/L
ALT SERPL-CCNC: 29 U/L
ALT SERPL-CCNC: 29 U/L (ref 16–61)
ANION GAP SERPL CALC-SCNC: 1 MMOL/L
ANION GAP SERPL CALC-SCNC: 1 MMOL/L (ref 0–18)
AST SERPL-CCNC: 24 U/L
AST SERPL-CCNC: 24 U/L (ref 15–37)
BASOPHILS # BLD AUTO: 0.03 X10(3) UL (ref 0–0.2)
BASOPHILS NFR BLD AUTO: 0.8 %
BILIRUB SERPL-MCNC: 0.6 MG/DL
BILIRUB SERPL-MCNC: 0.6 MG/DL (ref 0.1–2)
BILIRUB UR QL STRIP.AUTO: NEGATIVE
BUN BLD-MCNC: 20 MG/DL (ref 7–18)
BUN SERPL-MCNC: 20 MG/DL
BUN/CREAT SERPL: 21.3
BUN/CREAT SERPL: 21.3 (ref 10–20)
CALCIUM BLD-MCNC: 9 MG/DL (ref 8.5–10.1)
CALCIUM SERPL-MCNC: 9 MG/DL
CHLORIDE SERPL-SCNC: 109 MMOL/L
CHLORIDE SERPL-SCNC: 109 MMOL/L (ref 98–112)
CHOLEST SERPL-MCNC: 133 MG/DL
CHOLEST SMN-MCNC: 133 MG/DL (ref ?–200)
CHOLEST/HDLC SERPL: NORMAL {RATIO}
CO2 SERPL-SCNC: 29 MMOL/L
CO2 SERPL-SCNC: 29 MMOL/L (ref 21–32)
CREAT BLD-MCNC: 0.94 MG/DL (ref 0.7–1.3)
CREAT SERPL-MCNC: 0.94 MG/DL
CRP SERPL-MCNC: <0.29 MG/DL (ref ?–0.3)
DEPRECATED RDW RBC AUTO: 46.6 FL (ref 35.1–46.3)
EOSINOPHIL # BLD AUTO: 0.28 X10(3) UL (ref 0–0.7)
EOSINOPHIL NFR BLD AUTO: 7.9 %
ERYTHROCYTE [DISTWIDTH] IN BLOOD BY AUTOMATED COUNT: 13.7 % (ref 11–15)
EST. AVERAGE GLUCOSE BLD GHB EST-MCNC: 117 MG/DL (ref 68–126)
GLOBULIN PLAS-MCNC: 3.3 G/DL (ref 2.8–4.4)
GLOBULIN SER-MCNC: 3.3 G/DL
GLUCOSE BLD-MCNC: 95 MG/DL (ref 70–99)
GLUCOSE SERPL-MCNC: 95 MG/DL
GLUCOSE UR STRIP.AUTO-MCNC: NEGATIVE MG/DL
HBA1C MFR BLD HPLC: 5.7 % (ref ?–5.7)
HCT VFR BLD AUTO: 44.5 % (ref 39–53)
HDLC SERPL-MCNC: 41 MG/DL
HDLC SERPL-MCNC: 41 MG/DL (ref 40–59)
HGB BLD-MCNC: 14.9 G/DL (ref 13–17.5)
IMM GRANULOCYTES # BLD AUTO: 0.01 X10(3) UL (ref 0–1)
IMM GRANULOCYTES NFR BLD: 0.3 %
IRON SERPL-MCNC: 89 UG/DL (ref 65–175)
KETONES UR STRIP.AUTO-MCNC: NEGATIVE MG/DL
LDLC SERPL CALC-MCNC: 82 MG/DL
LDLC SERPL CALC-MCNC: 82 MG/DL (ref ?–100)
LENGTH OF FAST TIME PATIENT: YES H
LENGTH OF FAST TIME PATIENT: YES H
LEUKOCYTE ESTERASE UR QL STRIP.AUTO: NEGATIVE
LYMPHOCYTES # BLD AUTO: 0.87 X10(3) UL (ref 1–4)
LYMPHOCYTES NFR BLD AUTO: 24.5 %
M PROTEIN MFR SERPL ELPH: 6.9 G/DL (ref 6.4–8.2)
MCH RBC QN AUTO: 30.8 PG (ref 26–34)
MCHC RBC AUTO-ENTMCNC: 33.5 G/DL (ref 31–37)
MCV RBC AUTO: 92.1 FL (ref 80–100)
MONOCYTES # BLD AUTO: 0.46 X10(3) UL (ref 0.1–1)
MONOCYTES NFR BLD AUTO: 13 %
NEUTROPHILS # BLD AUTO: 1.9 X10 (3) UL (ref 1.5–7.7)
NEUTROPHILS # BLD AUTO: 1.9 X10(3) UL (ref 1.5–7.7)
NEUTROPHILS NFR BLD AUTO: 53.5 %
NITRITE UR QL STRIP.AUTO: NEGATIVE
NONHDLC SERPL-MCNC: 92 MG/DL
NONHDLC SERPL-MCNC: 92 MG/DL (ref ?–130)
OSMOLALITY SERPL CALC.SUM OF ELEC: 290 MOSM/KG (ref 275–295)
PATIENT FASTING Y/N/NP: YES
PATIENT FASTING Y/N/NP: YES
PH UR STRIP.AUTO: 5 [PH] (ref 4.5–8)
PLATELET # BLD AUTO: 164 10(3)UL (ref 150–450)
POTASSIUM SERPL-SCNC: 4 MMOL/L
POTASSIUM SERPL-SCNC: 4 MMOL/L (ref 3.5–5.1)
PROT SERPL-MCNC: 6.9 G/DL
PROT UR STRIP.AUTO-MCNC: NEGATIVE MG/DL
PSA SERPL-MCNC: <0.01 NG/ML (ref ?–4)
RBC # BLD AUTO: 4.83 X10(6)UL (ref 3.8–5.8)
RBC UR QL AUTO: NEGATIVE
SODIUM SERPL-SCNC: 139 MMOL/L
SODIUM SERPL-SCNC: 139 MMOL/L (ref 136–145)
SP GR UR STRIP.AUTO: 1.03 (ref 1–1.03)
TRIGL SERPL-MCNC: 52 MG/DL
TRIGL SERPL-MCNC: 52 MG/DL (ref 30–149)
UROBILINOGEN UR STRIP.AUTO-MCNC: <2 MG/DL
VLDLC SERPL CALC-MCNC: 10 MG/DL
VLDLC SERPL CALC-MCNC: 10 MG/DL (ref 0–30)
WBC # BLD AUTO: 3.6 X10(3) UL (ref 4–11)

## 2020-03-05 PROCEDURE — 80061 LIPID PANEL: CPT

## 2020-03-05 PROCEDURE — 86140 C-REACTIVE PROTEIN: CPT

## 2020-03-05 PROCEDURE — 81001 URINALYSIS AUTO W/SCOPE: CPT

## 2020-03-05 PROCEDURE — 85025 COMPLETE CBC W/AUTO DIFF WBC: CPT

## 2020-03-05 PROCEDURE — 36415 COLL VENOUS BLD VENIPUNCTURE: CPT

## 2020-03-05 PROCEDURE — 83036 HEMOGLOBIN GLYCOSYLATED A1C: CPT

## 2020-03-05 PROCEDURE — 84153 ASSAY OF PSA TOTAL: CPT

## 2020-03-05 PROCEDURE — 83540 ASSAY OF IRON: CPT

## 2020-03-05 PROCEDURE — 80053 COMPREHEN METABOLIC PANEL: CPT

## 2020-03-06 ENCOUNTER — TELEPHONE (OUTPATIENT)
Dept: CARDIOLOGY | Age: 70
End: 2020-03-06

## 2020-03-09 DIAGNOSIS — C61 MALIGNANT NEOPLASM OF PROSTATE (HCC): ICD-10-CM

## 2020-03-09 DIAGNOSIS — N52.31 ERECTILE DYSFUNCTION FOLLOWING RADICAL PROSTATECTOMY: ICD-10-CM

## 2020-03-09 NOTE — TELEPHONE ENCOUNTER
Pt would like refill of MUSE 500 MCG Urethral Pellet and MUSE 1000 MCG Urethral Pellet sent to [facility]/PHARMACY #[id number]- [place] - [facility] [phone], [phone] thank you.

## 2020-03-09 NOTE — TELEPHONE ENCOUNTER
Record shows MUSE 1000 mcg last ordered by dr Jerome Cohen 2/18/19 #18 RFx3  500 mcg last ordered 12/27/17 #6 RF x2  Last OV/cpx 5/31/19-has MAW visit scheduled4/2/2020    Call to pt-discussed above info and requested he clarify actual dosing info.    Pt sts h

## 2020-03-10 DIAGNOSIS — C61 MALIGNANT NEOPLASM OF PROSTATE (HCC): ICD-10-CM

## 2020-03-18 RX ORDER — METOPROLOL SUCCINATE 25 MG/1
TABLET, EXTENDED RELEASE ORAL
Qty: 90 TABLET | Refills: 0 | Status: SHIPPED | OUTPATIENT
Start: 2020-03-18 | End: 2020-06-22

## 2020-03-23 RX ORDER — ATORVASTATIN CALCIUM 20 MG/1
TABLET, FILM COATED ORAL
Qty: 90 TABLET | Refills: 2 | OUTPATIENT
Start: 2020-03-23

## 2020-03-25 ENCOUNTER — CLINICAL ABSTRACT (OUTPATIENT)
Dept: CARDIOLOGY | Age: 70
End: 2020-03-25

## 2020-04-15 ENCOUNTER — APPOINTMENT (OUTPATIENT)
Dept: CARDIOLOGY | Age: 70
End: 2020-04-15

## 2020-04-17 ENCOUNTER — E-ADVICE (OUTPATIENT)
Dept: CARDIOLOGY | Age: 70
End: 2020-04-17

## 2020-04-20 ENCOUNTER — V-VISIT (OUTPATIENT)
Dept: CARDIOLOGY | Age: 70
End: 2020-04-20

## 2020-04-20 ENCOUNTER — TELEPHONE (OUTPATIENT)
Dept: CARDIOLOGY | Age: 70
End: 2020-04-20

## 2020-04-20 VITALS
HEART RATE: 55 BPM | HEIGHT: 69 IN | WEIGHT: 185 LBS | DIASTOLIC BLOOD PRESSURE: 63 MMHG | SYSTOLIC BLOOD PRESSURE: 118 MMHG | BODY MASS INDEX: 27.4 KG/M2

## 2020-04-20 DIAGNOSIS — Z95.1 HX OF CABG: ICD-10-CM

## 2020-04-20 DIAGNOSIS — I25.10 CAD IN NATIVE ARTERY: Primary | Chronic | ICD-10-CM

## 2020-04-20 DIAGNOSIS — C61 PROSTATE CANCER (CMD): ICD-10-CM

## 2020-04-20 DIAGNOSIS — I44.1 MOBITZ TYPE 1 SECOND DEGREE AV BLOCK: ICD-10-CM

## 2020-04-20 DIAGNOSIS — E78.00 PURE HYPERCHOLESTEROLEMIA: ICD-10-CM

## 2020-04-20 PROCEDURE — 99214 OFFICE O/P EST MOD 30 MIN: CPT | Performed by: INTERNAL MEDICINE

## 2020-04-20 ASSESSMENT — PATIENT HEALTH QUESTIONNAIRE - PHQ9
SUM OF ALL RESPONSES TO PHQ9 QUESTIONS 1 AND 2: 0
SUM OF ALL RESPONSES TO PHQ9 QUESTIONS 1 AND 2: 0
1. LITTLE INTEREST OR PLEASURE IN DOING THINGS: NOT AT ALL
2. FEELING DOWN, DEPRESSED OR HOPELESS: NOT AT ALL

## 2020-05-07 ENCOUNTER — TELEPHONE (OUTPATIENT)
Dept: PODIATRY CLINIC | Facility: CLINIC | Age: 70
End: 2020-05-07

## 2020-05-07 NOTE — TELEPHONE ENCOUNTER
Wanted to thank Dr. Major Pinon for this patient referral.  Wanted you to know she was only able to evaluate this patient not treat as patient then requested to complete treatment at his chiropractic facility.   PJ

## 2020-05-12 ENCOUNTER — TELEPHONE (OUTPATIENT)
Dept: FAMILY MEDICINE CLINIC | Facility: CLINIC | Age: 70
End: 2020-05-12

## 2020-05-12 RX ORDER — ATORVASTATIN CALCIUM 20 MG/1
TABLET, FILM COATED ORAL
Qty: 90 TABLET | Refills: 3 | Status: SHIPPED | OUTPATIENT
Start: 2020-05-12 | End: 2021-04-28 | Stop reason: SDUPTHER

## 2020-05-12 NOTE — TELEPHONE ENCOUNTER
Received letter from [employer] that [id number] has a denial of coverage, for pt's Muse.

## 2020-05-12 NOTE — TELEPHONE ENCOUNTER
Pt requested to refill  MUSE 1000 MCG Urethral Pellet (Discontinued) 18      To be sent to:  [facility]/PHARMACY [address] [place] [address]. [phone], [phone]. Pt said his insurance company, [employer], will not cover this prescription.

## 2020-05-14 NOTE — TELEPHONE ENCOUNTER
I spoke with pt and got some of his information. Advised him that I would do a PA from our office as the PA that was denied was one that the pt had initiated.   A letter could be attached with the PA to lend more medical evidence that the pt needs this med

## 2020-05-26 ENCOUNTER — PATIENT MESSAGE (OUTPATIENT)
Dept: PODIATRY CLINIC | Facility: CLINIC | Age: 70
End: 2020-05-26

## 2020-05-26 NOTE — TELEPHONE ENCOUNTER
Called to [employer] to check status of the PA in process. Spoke with Tobias Castellanos, she reviewed the case again with the pharmacist and was told that it is a plan exclusion. I did bring up the fact again that the pt had a prostatectomy.   Tobias Castellanos did bring this u

## 2020-05-27 NOTE — TELEPHONE ENCOUNTER
Contacted via the telephone message left with regards to further treatment for his Achilles problem advised to be cautious with any kind of injections in and around the tendon especially steroid injections.   Advised stem cell injections or possible not pro

## 2020-05-27 NOTE — TELEPHONE ENCOUNTER
From: Michael Zohng  To: Alena Milan DPM  Sent: 5/26/2020 7:35 PM CDT  Subject: Visit Follow-up Question    Dr. Imtiaz Oconnor, I have been going to the [facility] on [address] for the past 3 1/2 weeks.  They have been doing variou

## 2020-05-29 ENCOUNTER — TELEPHONE (OUTPATIENT)
Dept: FAMILY MEDICINE CLINIC | Facility: CLINIC | Age: 70
End: 2020-05-29

## 2020-05-29 NOTE — TELEPHONE ENCOUNTER
Pt states Alprostadil, Vasodilator, (MUSE) 1000 MCG Urethral Pellet is getting expensive. He would like to know if he can try (sildenafil) 100 mg 3 mo supply a paper script. Please advise.  Thank you

## 2020-06-15 ENCOUNTER — OFFICE VISIT (OUTPATIENT)
Dept: PODIATRY CLINIC | Facility: CLINIC | Age: 70
End: 2020-06-15
Payer: MEDICARE

## 2020-06-15 DIAGNOSIS — M65.28 CALCIFIC ACHILLES TENDINITIS: ICD-10-CM

## 2020-06-15 DIAGNOSIS — M79.672 BILATERAL FOOT PAIN: Primary | ICD-10-CM

## 2020-06-15 DIAGNOSIS — M76.61 ACHILLES TENDINITIS OF BOTH LOWER EXTREMITIES: ICD-10-CM

## 2020-06-15 DIAGNOSIS — M76.62 ACHILLES TENDINITIS OF BOTH LOWER EXTREMITIES: ICD-10-CM

## 2020-06-15 DIAGNOSIS — M79.671 BILATERAL FOOT PAIN: Primary | ICD-10-CM

## 2020-06-15 PROCEDURE — 99213 OFFICE O/P EST LOW 20 MIN: CPT | Performed by: PODIATRIST

## 2020-06-15 NOTE — PROGRESS NOTES
Sara Cagle is a 71year old male. Patient presents with: Follow - Up: patient has been going to physical therapy - feet are better, but pain still present at times - pain not as often as it used to be - pain scale at worst 6/10.         HPI:     Patient 2001   • CATARACT Right 10/2017   • CATARACT Left 11/2017   • COLONOSCOPY  7/17, 10/12, 12/06, 1/01    Dr. Lb De La Rosa - repeat in 5yrs    • INNER EAR SURGERY [facility] [address]    replaced bones in right middle ear   • OTHER SURGICAL HISTORY  01    prostate surg normal.  Patient has a prominence on the posterior aspect of the heel still has some pain upon palpation.         ASSESSMENT AND PLAN:   Diagnoses and all orders for this visit:    Bilateral foot pain    Achilles tendinitis of both lower extremities

## 2020-06-18 ENCOUNTER — OFFICE VISIT (OUTPATIENT)
Dept: FAMILY MEDICINE CLINIC | Facility: CLINIC | Age: 70
End: 2020-06-18
Payer: MEDICARE

## 2020-06-18 VITALS
RESPIRATION RATE: 16 BRPM | HEART RATE: 60 BPM | WEIGHT: 186 LBS | SYSTOLIC BLOOD PRESSURE: 114 MMHG | TEMPERATURE: 98 F | DIASTOLIC BLOOD PRESSURE: 70 MMHG | BODY MASS INDEX: 27.55 KG/M2 | HEIGHT: 69 IN

## 2020-06-18 DIAGNOSIS — Z11.59 NEED FOR HEPATITIS C SCREENING TEST: ICD-10-CM

## 2020-06-18 DIAGNOSIS — Z85.46 PERSONAL HISTORY OF PROSTATE CANCER: ICD-10-CM

## 2020-06-18 DIAGNOSIS — Z00.00 ENCOUNTER FOR ANNUAL HEALTH EXAMINATION: Primary | ICD-10-CM

## 2020-06-18 DIAGNOSIS — E78.00 PURE HYPERCHOLESTEROLEMIA: ICD-10-CM

## 2020-06-18 DIAGNOSIS — I25.10 ATHEROSCLEROSIS OF NATIVE CORONARY ARTERY OF NATIVE HEART WITHOUT ANGINA PECTORIS: ICD-10-CM

## 2020-06-18 DIAGNOSIS — N52.31 ERECTILE DYSFUNCTION FOLLOWING RADICAL PROSTATECTOMY: ICD-10-CM

## 2020-06-18 PROCEDURE — G0438 PPPS, INITIAL VISIT: HCPCS | Performed by: FAMILY MEDICINE

## 2020-06-18 PROCEDURE — 99213 OFFICE O/P EST LOW 20 MIN: CPT | Performed by: FAMILY MEDICINE

## 2020-06-18 RX ORDER — METOPROLOL SUCCINATE 25 MG/1
25 TABLET, EXTENDED RELEASE ORAL DAILY
COMMUNITY

## 2020-06-18 RX ORDER — SILDENAFIL 100 MG/1
100 TABLET, FILM COATED ORAL
Qty: 30 TABLET | Refills: 1 | Status: SHIPPED | OUTPATIENT
Start: 2020-06-18 | End: 2021-03-15

## 2020-06-18 NOTE — PATIENT INSTRUCTIONS
[address] [facility] SCREENING SCHEDULE   Tests on this list are recommended by your physician but may not be covered, or covered at this frequency, by your insurer. Please check with your insurance carrier before scheduling to verify coverage.     PREVENTATIVE Men who are 73-68 years old and have smoked more than 100 cigarettes in their lifetime   • Anyone with a family history    Colorectal Cancer Screening Covered up to Age 76     Colonoscopy Screen   Covered every 10 years- more often if abnormal Colonoscopy Only covered with a cut with metal- TD and TDaP Not covered by Medicare Part B) No orders found for this or any previous visit.  This may be covered with your prescription benefits, but Medicare does not cover unless Medically needed    Zoster (Not covered weighing more than 9 pounds     Covered at least every 3 years,         Glucose (mg/dL)   Date Value   03/05/2020 95     GLUCOSE (mg/dL)   Date Value   11/01/2014 92    Medicare covers annually or at 6-month intervals for prediabetic patients        Cardio in this risk group, make sure you have a referral   Prostate Cancer Screening      PSA  Annually to 75 then as needed or EUGENIE annually to 75 PSA due on 03/05/2022  No results found for this or any previous visit.    Annually (age 48 or over), EUGENIE not paid se it's website for anyone to review and print using their home computer and printer. (the forms are also available in [address])  [email or web address]  This link also has information from the [facility] [place] regarding Advance Directives.

## 2020-06-22 RX ORDER — METOPROLOL SUCCINATE 25 MG/1
TABLET, EXTENDED RELEASE ORAL
Qty: 90 TABLET | Refills: 3 | Status: SHIPPED | OUTPATIENT
Start: 2020-06-22 | End: 2021-04-28 | Stop reason: SDUPTHER

## 2020-06-22 NOTE — PROGRESS NOTES
HPI:   Vertie Schilder is a 71year old male who presents for a Medicare Initial Preventative Physical Exam (Welcome to Medicare- < 12 months on Medicare). Pt has a history of coronary artery disease and prostate cancer.   He had a total prostatectomy for for Alcohol abuse and had a score of 0 so is at low risk.      Patient Care Team: Patient Care Team:  Maria Luisa Espinosa MD as PCP - [facility])  Chata Harris MD (GASTROENTEROLOGY)    Patient Active Problem List:     Screening for genitour Nasal Suspension, SQUIRT 2 SPRAYS INTO EACH NOSTRIL ONCE DAILY  Atorvastatin Calcium 20 MG Oral Tab, Take 1 tablet (20 mg total) by mouth daily.   MetroNIDAZOLE 1 % External Gel, Use as directed  omega-3 fatty acids (FISH OIL) 1000 MG Oral Cap, Take 4,000 m Resp 16   Ht 69\"   Wt 186 lb (84.4 kg)   BMI 27.47 kg/m²   Estimated body mass index is 27.47 kg/m² as calculated from the following:    Height as of this encounter: 69\". Weight as of this encounter: 186 lb (84.4 kg).     Medicare Hearing Assessment  ( Normal            Vaccination History     Immunization History   Administered Date(s) Administered   • FLUAD High Dose 72 yr and older (93238) 01/15/2018        ASSESSMENT AND OTHER RELEVANT CHRONIC CONDITIONS:   John Baptiste is a 71year old male who pres SERVICES  INDICATIONS AND SCHEDULE Internal Lab or Procedure External Lab or Procedure   Diabetes Screening      HbgA1C   Annually HgbA1C (%)   Date Value   03/05/2020 5.7 (H)       No flowsheet data found.     Fasting Blood Sugar (FSB)Annually Glucose (mg/ metal- TD and TDaP Not covered by Medicare Part B) No vaccine history found This may be covered with your prescription benefits, but Medicare does not cover unless Medically needed    Zoster   Not covered by Medicare Part B No vaccine history found This ma

## 2020-07-20 ENCOUNTER — OFFICE VISIT (OUTPATIENT)
Dept: PODIATRY CLINIC | Facility: CLINIC | Age: 70
End: 2020-07-20
Payer: MEDICARE

## 2020-07-20 DIAGNOSIS — M76.62 ACHILLES TENDINITIS OF BOTH LOWER EXTREMITIES: ICD-10-CM

## 2020-07-20 DIAGNOSIS — M79.671 BILATERAL FOOT PAIN: Primary | ICD-10-CM

## 2020-07-20 DIAGNOSIS — M79.672 BILATERAL FOOT PAIN: Primary | ICD-10-CM

## 2020-07-20 DIAGNOSIS — M65.28 CALCIFIC ACHILLES TENDINITIS: ICD-10-CM

## 2020-07-20 DIAGNOSIS — M76.61 ACHILLES TENDINITIS OF BOTH LOWER EXTREMITIES: ICD-10-CM

## 2020-07-20 PROCEDURE — 99213 OFFICE O/P EST LOW 20 MIN: CPT | Performed by: PODIATRIST

## 2020-07-20 NOTE — PROGRESS NOTES
Consuelo Jiang is a 71year old male. Patient presents with: Follow - Up: Patient has been doing physical therapy for bilateral heel spurs. Patient has noticed improvement, he is now down to doing therapy 1x weekly.  3-4/10 for pain, patient did alot of walk 10/2017   • CATARACT Left 11/2017   • COLONOSCOPY  7/17, 10/12, 12/06, 1/01    Dr. Alise Montgomery - repeat in 5yrs    • INNER EAR SURGERY [facility]  90    replaced bones in right middle ear   • OTHER SURGICAL HISTORY  01    prostate surgery   • SINUS SURGERY tenderness upon posterior aspect of the heel at the insertion of the spur.         ASSESSMENT AND PLAN:   Diagnoses and all orders for this visit:    Bilateral foot pain    Achilles tendinitis of both lower extremities    Calcific Achilles tendinitis

## 2020-08-18 ENCOUNTER — TELEPHONE (OUTPATIENT)
Dept: FAMILY MEDICINE CLINIC | Facility: CLINIC | Age: 70
End: 2020-08-18

## 2020-08-21 ENCOUNTER — NURSE ONLY (OUTPATIENT)
Dept: FAMILY MEDICINE CLINIC | Facility: CLINIC | Age: 70
End: 2020-08-21
Payer: MEDICARE

## 2020-08-21 DIAGNOSIS — Z23 NEED FOR VACCINATION: Primary | ICD-10-CM

## 2020-08-21 PROCEDURE — 90472 IMMUNIZATION ADMIN EACH ADD: CPT | Performed by: FAMILY MEDICINE

## 2020-08-21 PROCEDURE — 90732 PPSV23 VACC 2 YRS+ SUBQ/IM: CPT | Performed by: FAMILY MEDICINE

## 2020-08-21 PROCEDURE — 90750 HZV VACC RECOMBINANT IM: CPT | Performed by: FAMILY MEDICINE

## 2020-08-21 PROCEDURE — G0009 ADMIN PNEUMOCOCCAL VACCINE: HCPCS | Performed by: FAMILY MEDICINE

## 2020-08-21 NOTE — PROGRESS NOTES
Pt here for shingrix # 1. He was advised medicare does not cover this. He voiced understanding. ABN signed. Also asks for pneumovax as he is over 65. Denies having one previously. OK per Dr Jey Johnson to order. Pt also reports last TDAP and Hep A.   I have upda

## 2020-08-26 ENCOUNTER — TELEPHONE (OUTPATIENT)
Dept: PODIATRY CLINIC | Facility: CLINIC | Age: 70
End: 2020-08-26

## 2020-08-31 ENCOUNTER — OFFICE VISIT (OUTPATIENT)
Dept: PODIATRY CLINIC | Facility: CLINIC | Age: 70
End: 2020-08-31
Payer: MEDICARE

## 2020-08-31 DIAGNOSIS — M76.62 ACHILLES TENDINITIS OF BOTH LOWER EXTREMITIES: Primary | ICD-10-CM

## 2020-08-31 DIAGNOSIS — M79.672 BILATERAL FOOT PAIN: ICD-10-CM

## 2020-08-31 DIAGNOSIS — M79.671 BILATERAL FOOT PAIN: ICD-10-CM

## 2020-08-31 DIAGNOSIS — M65.28 CALCIFIC ACHILLES TENDINITIS: ICD-10-CM

## 2020-08-31 DIAGNOSIS — M76.61 ACHILLES TENDINITIS OF BOTH LOWER EXTREMITIES: Primary | ICD-10-CM

## 2020-08-31 PROCEDURE — 99213 OFFICE O/P EST LOW 20 MIN: CPT | Performed by: PODIATRIST

## 2020-08-31 NOTE — PROGRESS NOTES
Blaire Reynoso is a 71year old male. Patient presents with: Follow - Up: still getting annoying pain - pain scale 3-5/10 - going to physical therapy once a week. HPI:     Continues to have Achilles insertional pain with bony prominence.   He has been CATARACT Right 10/2017   • CATARACT Left 11/2017   • COLONOSCOPY  7/17, 10/12, 12/06, 1/01    Dr. Doc Summers - repeat in 5yrs    • INNER EAR SURGERY [facility]  90    replaced bones in right middle ear   • OTHER SURGICAL HISTORY  01    prostate surgery   • S a bony prominence at the insertion of the Achilles with prominence of the posterior aspect of the heel tenderness to palpation.         ASSESSMENT AND PLAN:   Diagnoses and all orders for this visit:    Achilles tendinitis of both lower extremities    Calci

## 2020-09-01 ENCOUNTER — TELEPHONE (OUTPATIENT)
Dept: FAMILY MEDICINE CLINIC | Facility: CLINIC | Age: 70
End: 2020-09-01

## 2020-09-01 NOTE — TELEPHONE ENCOUNTER
Dr. Josh Ruiz is very good but getting a second opinion never hurts.   Suggest he call Dr. Vel Handy from [facility] to set up an appointment  for second opinion

## 2020-09-01 NOTE — TELEPHONE ENCOUNTER
Pt has a very painful heel spur. Dr. Aixa Tobin is recommending surgery in the near future. Pt is asking for Dr. Lavon Canavan opinion if he should continue with the podiatrist or maybe see an ortho.

## 2020-09-02 ENCOUNTER — PATIENT MESSAGE (OUTPATIENT)
Dept: FAMILY MEDICINE CLINIC | Facility: CLINIC | Age: 70
End: 2020-09-02

## 2021-01-01 ENCOUNTER — EXTERNAL RECORD (OUTPATIENT)
Dept: HEALTH INFORMATION MANAGEMENT | Facility: OTHER | Age: 71
End: 2021-01-01

## 2021-01-26 ENCOUNTER — TELEPHONE (OUTPATIENT)
Dept: CARDIOLOGY | Age: 71
End: 2021-01-26

## 2021-01-26 ENCOUNTER — TELEPHONE (OUTPATIENT)
Dept: FAMILY MEDICINE CLINIC | Facility: CLINIC | Age: 71
End: 2021-01-26

## 2021-01-26 DIAGNOSIS — Z85.46 PERSONAL HISTORY OF PROSTATE CANCER: Primary | ICD-10-CM

## 2021-02-06 DIAGNOSIS — Z23 NEED FOR VACCINATION: ICD-10-CM

## 2021-02-17 ENCOUNTER — APPOINTMENT (OUTPATIENT)
Dept: CARDIOLOGY | Age: 71
End: 2021-02-17

## 2021-02-23 ENCOUNTER — TELEPHONE (OUTPATIENT)
Dept: FAMILY MEDICINE CLINIC | Facility: CLINIC | Age: 71
End: 2021-02-23

## 2021-02-23 DIAGNOSIS — Z01.818 PRE-OP TESTING: Primary | ICD-10-CM

## 2021-02-23 NOTE — TELEPHONE ENCOUNTER
Fax received from  [employer]. Pt is scheduled for surgery on  03/23/2021 at [facility] with Dr. Martin Dowd for a right achilles debridement w/ secondary repair, right calcaneal exostectomy, possible gastrocnemius recession.

## 2021-02-24 ENCOUNTER — TELEPHONE (OUTPATIENT)
Dept: FAMILY MEDICINE CLINIC | Facility: CLINIC | Age: 71
End: 2021-02-24

## 2021-02-24 NOTE — TELEPHONE ENCOUNTER
I scheduled patient for preop on 3/15/21. Patient is having a Physical at work on 3/12/21. This Physical will include an EKG. Patient is wondering will this EKG suffice . Please advise. Thank you.

## 2021-02-25 NOTE — TELEPHONE ENCOUNTER
Lm to cb, spoke with Mayi Peace from [facility], she states that the EKG must be 12 lead and signed by an MD

## 2021-02-26 NOTE — TELEPHONE ENCOUNTER
Pt's visit is a pre op. He needs to have his EKG at the hospital prior to his surgery. [facility] advising him of this. Asked to cb if questions.

## 2021-03-09 ENCOUNTER — TELEPHONE (OUTPATIENT)
Dept: FAMILY MEDICINE CLINIC | Facility: CLINIC | Age: 71
End: 2021-03-09

## 2021-03-09 NOTE — TELEPHONE ENCOUNTER
Patient states he has an upcoming pre-op appointment on Monday, 3/15/21-->Work physical this Friday with EKG, returning call. I made him aware of notation below on 2/16/21--> 12-lead EKG with MD signature will be accepted.  He will bring it to his appointme

## 2021-03-15 ENCOUNTER — OFFICE VISIT (OUTPATIENT)
Dept: FAMILY MEDICINE CLINIC | Facility: CLINIC | Age: 71
End: 2021-03-15
Payer: MEDICARE

## 2021-03-15 VITALS
SYSTOLIC BLOOD PRESSURE: 102 MMHG | WEIGHT: 192 LBS | RESPIRATION RATE: 16 BRPM | HEART RATE: 56 BPM | BODY MASS INDEX: 28.44 KG/M2 | DIASTOLIC BLOOD PRESSURE: 60 MMHG | HEIGHT: 69 IN | TEMPERATURE: 98 F

## 2021-03-15 DIAGNOSIS — C61 MALIGNANT NEOPLASM OF PROSTATE (HCC): ICD-10-CM

## 2021-03-15 DIAGNOSIS — Z01.818 PREOPERATIVE CLEARANCE: Primary | ICD-10-CM

## 2021-03-15 DIAGNOSIS — E78.00 PURE HYPERCHOLESTEROLEMIA: ICD-10-CM

## 2021-03-15 DIAGNOSIS — M77.31 EXOSTOSIS OF RIGHT POSTERIOR CALCANEUS: ICD-10-CM

## 2021-03-15 DIAGNOSIS — I25.10 ATHEROSCLEROSIS OF NATIVE CORONARY ARTERY OF NATIVE HEART WITHOUT ANGINA PECTORIS: ICD-10-CM

## 2021-03-15 DIAGNOSIS — H90.3 ASYMMETRIC SNHL (SENSORINEURAL HEARING LOSS): ICD-10-CM

## 2021-03-15 PROCEDURE — 99214 OFFICE O/P EST MOD 30 MIN: CPT | Performed by: FAMILY MEDICINE

## 2021-03-15 NOTE — PROGRESS NOTES
Polly Anguiano is a 79year old male who presents for a pre-operative physical exam. Patient is to have right Achilles debridement with secondary repair, right calcaneal exostectomy and possible gastrocnemius recession, to be done by Dr. Gorge Lino at [facility] Right 10/2017   • CATARACT Left 11/2017   • COLONOSCOPY  7/17, 10/12, 12/06, 1/01    Dr. Alise Montgomery - repeat in 5yrs    • INNER EAR SURGERY [facility] [address]    replaced bones in right middle ear   • OTHER SURGICAL HISTORY  01    prostate surgery   • SINUS SURG three,cranial nerves are intact,motor and sensory are grossly intact    ASSESSMENT AND PLAN:   Sara Cagle is a 79year old male who presents for a pre-operative physical exam. Patient is to have right Achilles debridement with secondary repair, right star

## 2021-04-20 ENCOUNTER — TELEPHONE (OUTPATIENT)
Dept: CARDIOLOGY | Age: 71
End: 2021-04-20

## 2021-04-21 ENCOUNTER — TELEPHONE (OUTPATIENT)
Dept: FAMILY MEDICINE CLINIC | Facility: CLINIC | Age: 71
End: 2021-04-21

## 2021-04-21 ENCOUNTER — MED REC SCAN ONLY (OUTPATIENT)
Dept: FAMILY MEDICINE CLINIC | Facility: CLINIC | Age: 71
End: 2021-04-21

## 2021-04-21 NOTE — TELEPHONE ENCOUNTER
Labs were faxed to us. I put these in Dr Nika Warner bin on Monday. They are on the way to scan. Need to await them to be scanned in for Dr Nika Warner to review to determine if pt needs anything prior to his appt which is not until June.

## 2021-04-21 NOTE — TELEPHONE ENCOUNTER
Pt had labs and other info sent over from [facility] that was completed and would like to know if anything else is required before his upcoming appointment? Please advise.

## 2021-04-22 RX ORDER — ONDANSETRON 4 MG/1
TABLET, ORALLY DISINTEGRATING ORAL
COMMUNITY
Start: 2021-04-06 | End: 2021-04-26

## 2021-04-22 RX ORDER — HYDROCODONE BITARTRATE AND ACETAMINOPHEN 5; 325 MG/1; MG/1
1-2 TABLET ORAL EVERY 6 HOURS PRN
COMMUNITY
Start: 2021-04-06 | End: 2021-04-26

## 2021-04-26 ENCOUNTER — OFFICE VISIT (OUTPATIENT)
Dept: CARDIOLOGY | Age: 71
End: 2021-04-26

## 2021-04-26 ENCOUNTER — APPOINTMENT (OUTPATIENT)
Dept: CARDIOLOGY | Age: 71
End: 2021-04-26

## 2021-04-26 VITALS
BODY MASS INDEX: 27.99 KG/M2 | WEIGHT: 189 LBS | SYSTOLIC BLOOD PRESSURE: 98 MMHG | HEART RATE: 60 BPM | DIASTOLIC BLOOD PRESSURE: 60 MMHG | HEIGHT: 69 IN

## 2021-04-26 DIAGNOSIS — I44.1 MOBITZ TYPE 1 SECOND DEGREE AV BLOCK: ICD-10-CM

## 2021-04-26 DIAGNOSIS — Z95.1 HX OF CABG: ICD-10-CM

## 2021-04-26 DIAGNOSIS — E78.00 PURE HYPERCHOLESTEROLEMIA: ICD-10-CM

## 2021-04-26 DIAGNOSIS — I25.10 CAD IN NATIVE ARTERY: Primary | Chronic | ICD-10-CM

## 2021-04-26 PROCEDURE — 99215 OFFICE O/P EST HI 40 MIN: CPT | Performed by: INTERNAL MEDICINE

## 2021-04-26 RX ORDER — MAGNESIUM HYDROXIDE 400 MG/5ML
SUSPENSION, ORAL (FINAL DOSE FORM) ORAL DAILY
COMMUNITY

## 2021-04-26 ASSESSMENT — PATIENT HEALTH QUESTIONNAIRE - PHQ9
SUM OF ALL RESPONSES TO PHQ9 QUESTIONS 1 AND 2: 0
SUM OF ALL RESPONSES TO PHQ9 QUESTIONS 1 AND 2: 0
2. FEELING DOWN, DEPRESSED OR HOPELESS: NOT AT ALL
1. LITTLE INTEREST OR PLEASURE IN DOING THINGS: NOT AT ALL
CLINICAL INTERPRETATION OF PHQ2 SCORE: NO FURTHER SCREENING NEEDED
CLINICAL INTERPRETATION OF PHQ9 SCORE: NO FURTHER SCREENING NEEDED
SUM OF ALL RESPONSES TO PHQ9 QUESTIONS 1 AND 2: 0
CLINICAL INTERPRETATION OF PHQ2 SCORE: NO FURTHER SCREENING NEEDED
1. LITTLE INTEREST OR PLEASURE IN DOING THINGS: NOT AT ALL
2. FEELING DOWN, DEPRESSED OR HOPELESS: NOT AT ALL

## 2021-04-26 ASSESSMENT — ENCOUNTER SYMPTOMS
FEVER: 0
HEMOPTYSIS: 0
SUSPICIOUS LESIONS: 0
ALLERGIC/IMMUNOLOGIC COMMENTS: NO NEW FOOD ALLERGIES
COUGH: 0
WEIGHT GAIN: 0
BRUISES/BLEEDS EASILY: 0
WEIGHT LOSS: 0
HEMATOCHEZIA: 0
CHILLS: 0

## 2021-04-29 RX ORDER — METOPROLOL SUCCINATE 25 MG/1
25 TABLET, EXTENDED RELEASE ORAL DAILY
Qty: 90 TABLET | Refills: 3 | Status: SHIPPED | OUTPATIENT
Start: 2021-04-29

## 2021-04-29 RX ORDER — ATORVASTATIN CALCIUM 20 MG/1
20 TABLET, FILM COATED ORAL DAILY
Qty: 90 TABLET | Refills: 3 | Status: SHIPPED | OUTPATIENT
Start: 2021-04-29

## 2021-04-30 NOTE — TELEPHONE ENCOUNTER
Labs showed up in external media. I have printed lab portion and placed at Dr Greta Stacy desk to see if pt needs any labs prior to his June appt.

## 2021-04-30 NOTE — TELEPHONE ENCOUNTER
Dr Judy Torres reviewed. Labs were in range. Pt does not need any labs prior to his June appt. Pt advised. He voiced understanding.

## 2021-06-21 ENCOUNTER — OFFICE VISIT (OUTPATIENT)
Dept: FAMILY MEDICINE CLINIC | Facility: CLINIC | Age: 71
End: 2021-06-21
Payer: MEDICARE

## 2021-06-21 VITALS
WEIGHT: 193 LBS | HEART RATE: 56 BPM | TEMPERATURE: 98 F | RESPIRATION RATE: 16 BRPM | HEIGHT: 69 IN | BODY MASS INDEX: 28.58 KG/M2 | SYSTOLIC BLOOD PRESSURE: 118 MMHG | DIASTOLIC BLOOD PRESSURE: 72 MMHG

## 2021-06-21 DIAGNOSIS — I25.10 ATHEROSCLEROSIS OF NATIVE CORONARY ARTERY OF NATIVE HEART WITHOUT ANGINA PECTORIS: ICD-10-CM

## 2021-06-21 DIAGNOSIS — E78.00 PURE HYPERCHOLESTEROLEMIA: ICD-10-CM

## 2021-06-21 DIAGNOSIS — H90.3 ASYMMETRIC SNHL (SENSORINEURAL HEARING LOSS): ICD-10-CM

## 2021-06-21 DIAGNOSIS — Z00.00 ENCOUNTER FOR ANNUAL HEALTH EXAMINATION: Primary | ICD-10-CM

## 2021-06-21 DIAGNOSIS — N52.31 ERECTILE DYSFUNCTION FOLLOWING RADICAL PROSTATECTOMY: ICD-10-CM

## 2021-06-21 DIAGNOSIS — C61 MALIGNANT NEOPLASM OF PROSTATE (HCC): ICD-10-CM

## 2021-06-21 DIAGNOSIS — Z11.59 NEED FOR HEPATITIS C SCREENING TEST: ICD-10-CM

## 2021-06-21 PROCEDURE — G0439 PPPS, SUBSEQ VISIT: HCPCS | Performed by: FAMILY MEDICINE

## 2021-06-21 PROCEDURE — 99213 OFFICE O/P EST LOW 20 MIN: CPT | Performed by: FAMILY MEDICINE

## 2021-06-21 NOTE — PROGRESS NOTES
HPI:   Seth Gonzalez is a 79year old male who presents for a Medicare Subsequent Annual Wellness visit (Pt already had Initial Annual Wellness). Pt has a history of coronary artery disease and prostate cancer.   He had a total prostatectomy for his can deficiency anemia     Carbuncle and furuncle of unspecified site     Encounter for long-term (current) use of other medications     Coronary atherosclerosis     Pure hypercholesterolemia     Malignant neoplasm of prostate [facility])     Annual physical exam polyp (7/17, 10/12, 12/06) and Prostate cancer ([facility] [address] (2001).     He  has a past surgical history that includes cabg (2001); inner ear surgery proc unlisted (90); sinus surgery   (97); other surgical history (01); vasectomy (96); tonsillectomy (56); colonosc cooperative, no distress, appears stated age   Head:  Normocephalic, without obvious abnormality, atraumatic   Eyes:  PERRL, conjunctiva/corneas clear, EOM's intact, both eyes   Ears:  Normal TM's and external ear canals, both ears   Nose: Nares normal, se native coronary artery of native heart without angina pectoris  Continue metoprolol succinate 25 mg 1 tablet daily  Continue aspirin 81 mg 1 tablet daily  - OFFICE/OUTPT VISIT,ESTZOE III  - COMP METABOLIC PANEL (14); Future  - LIPID PANEL; Future    3.  P (mg/dL)   Date Value   11/01/2014 92       Cardiovascular Disease Screening     LDL Annually LDL Cholesterol (mg/dL)   Date Value   03/05/2020 82     LDL-CHOLESTEROL (mg/dL (calc))   Date Value   11/01/2014 90        EKG - w/ Initial Preventative Physical prescription benefits

## 2021-06-22 NOTE — PATIENT INSTRUCTIONS
[address] [facility] SCREENING SCHEDULE   Tests on this list are recommended by your physician but may not be covered, or covered at this frequency, by your insurer. Please check with your insurance carrier before scheduling to verify coverage.    PREVENTATIV Pneumococcal Each vaccine ([id number] & [id number]) covered once after 65 Prevnar 13: -    [id number]: 08/21/2020     No recommendations at this time    Hepatitis B One screening covered for patients with certain risk factors   -  No recommendations at th

## 2021-07-19 DIAGNOSIS — N52.31 ERECTILE DYSFUNCTION FOLLOWING RADICAL PROSTATECTOMY: ICD-10-CM

## 2021-07-19 DIAGNOSIS — C61 MALIGNANT NEOPLASM OF PROSTATE (HCC): ICD-10-CM

## 2021-07-19 NOTE — TELEPHONE ENCOUNTER
Pt would like to try again to see if this medication would be covered by insurance. He has a new prescription drug plan. I asked the pt to try and scan the card into his my chart. If he is not able to do that I asked pt to drop off a copy of the card.  He w

## 2021-10-28 ENCOUNTER — TELEPHONE (OUTPATIENT)
Dept: FAMILY MEDICINE CLINIC | Facility: CLINIC | Age: 71
End: 2021-10-28

## 2021-10-28 ENCOUNTER — PATIENT MESSAGE (OUTPATIENT)
Dept: FAMILY MEDICINE CLINIC | Facility: CLINIC | Age: 71
End: 2021-10-28

## 2021-10-28 NOTE — TELEPHONE ENCOUNTER
Per pt he is waiting for the company to be calling back to  Inform them of our fax # and pt will be sending us info via [address]

## 2021-11-01 NOTE — TELEPHONE ENCOUNTER
I called [employer] at [phone] and they are going to fax a from to me to do a PA for the pt.s prescription of MUSE. I notified pt. I will update him once the form is received and completed and returned to pt. Pt verbalized understanding.

## 2021-11-22 ENCOUNTER — PATIENT MESSAGE (OUTPATIENT)
Dept: FAMILY MEDICINE CLINIC | Facility: CLINIC | Age: 71
End: 2021-11-22

## 2021-11-22 ENCOUNTER — TELEPHONE (OUTPATIENT)
Dept: FAMILY MEDICINE CLINIC | Facility: CLINIC | Age: 71
End: 2021-11-22

## 2021-11-23 ENCOUNTER — PATIENT MESSAGE (OUTPATIENT)
Dept: FAMILY MEDICINE CLINIC | Facility: CLINIC | Age: 71
End: 2021-11-23

## 2021-12-06 ENCOUNTER — PATIENT MESSAGE (OUTPATIENT)
Dept: FAMILY MEDICINE CLINIC | Facility: CLINIC | Age: 71
End: 2021-12-06

## 2021-12-06 ENCOUNTER — TELEPHONE (OUTPATIENT)
Dept: FAMILY MEDICINE CLINIC | Facility: CLINIC | Age: 71
End: 2021-12-06

## 2021-12-06 NOTE — TELEPHONE ENCOUNTER
ABIGAILTCB. PA for MUSE was completed. I received a fax from the insurance company the medication was denied again.

## 2021-12-07 ENCOUNTER — PATIENT MESSAGE (OUTPATIENT)
Dept: FAMILY MEDICINE CLINIC | Facility: CLINIC | Age: 71
End: 2021-12-07

## 2021-12-07 NOTE — TELEPHONE ENCOUNTER
I sent another PA with updated information and a request for a non formulary exception. I am waiting on a determination.

## 2021-12-25 ENCOUNTER — TELEPHONE (OUTPATIENT)
Dept: FAMILY MEDICINE CLINIC | Facility: CLINIC | Age: 71
End: 2021-12-25

## 2021-12-25 NOTE — TELEPHONE ENCOUNTER
Patient's wife called because patient had a positive home covid test. He developed symptoms yesterday 12/24/2021. He has been coughing. His pulse oximeter has been 95-96% with a normal pulse. She asked what could be done.  I advised that monoclonal antibody

## 2021-12-27 ENCOUNTER — TELEMEDICINE (OUTPATIENT)
Dept: FAMILY MEDICINE CLINIC | Facility: CLINIC | Age: 71
End: 2021-12-27

## 2021-12-27 DIAGNOSIS — U07.1 COVID-19: Primary | ICD-10-CM

## 2021-12-27 DIAGNOSIS — J06.9 VIRAL UPPER RESPIRATORY TRACT INFECTION WITH COUGH: ICD-10-CM

## 2021-12-27 PROCEDURE — 99214 OFFICE O/P EST MOD 30 MIN: CPT | Performed by: STUDENT IN AN ORGANIZED HEALTH CARE EDUCATION/TRAINING PROGRAM

## 2021-12-27 RX ORDER — BENZONATATE 100 MG/1
100 CAPSULE ORAL 3 TIMES DAILY PRN
Qty: 30 CAPSULE | Refills: 0 | Status: SHIPPED | OUTPATIENT
Start: 2021-12-27 | End: 2022-01-06

## 2021-12-27 NOTE — PROGRESS NOTES
Subjective     HPI:   Zuleyma King verbally consents to a Virtual/Telephone Check-In service on 12/27/21. Patient understands and accepts financial responsibility for any deductible, co-insurance and/or co-pays associated with this service.  This visit is c lips, mucosa, and tongue normal; teeth and gums normal, Speaking in full sentences comfortably, Normal work of breathing, symmetric, no curvature. ROM normal. No CVA tenderness. , extremities normal, atraumatic, no cyanosis or edema, Skin color, texture, tu

## 2021-12-27 NOTE — PATIENT INSTRUCTIONS
[phone] [facility]    Coronavirus Disease 2019 (COVID-19)     [facility] is committed to the safety and well-being of our patients, members, employees, and communities.  As concerns arise about the new strain of coronavirus that c without testing from date of last exposure  • After day 7 from date of last exposure with a negative test result (test must occur on day 5 or later)  After stopping quarantine, you should  • Watch for symptoms until 14 days after exposure.   • If you have s instructions.          Seek Further Care     If you are awaiting test results or are confirmed positive for COVID -19, and your symptoms worsen at home with symptoms such as: extreme weakness, difficult breathing, or unrelenting fevers greater than 100.4 de results. Post-Discharge Follow-up  If you are diagnosed with COVID, refrain from exercise until approved by your primary care provider. Please call your primary care provider within 2 days of your discharge to arrange for a telehealth follow-up.  CDC hernadez [employer].Zadspace.pt. pdf  Centers for Disease Control & Prevention (CDC)  10 things you can do to manage your health at home, Adriane.nl. pdf  ht people    References:  Long haulers: Why some people experience long-term coronavirus symptoms. (2021, February 08). Retrieved March 17, 2021, from [email or web address]-[email or web address]. html  Long-term effects of

## 2021-12-28 ENCOUNTER — NURSE ONLY (OUTPATIENT)
Dept: LAB | Age: 71
End: 2021-12-28
Attending: STUDENT IN AN ORGANIZED HEALTH CARE EDUCATION/TRAINING PROGRAM
Payer: MEDICARE

## 2021-12-28 DIAGNOSIS — J06.9 VIRAL UPPER RESPIRATORY TRACT INFECTION WITH COUGH: ICD-10-CM

## 2021-12-28 DIAGNOSIS — U07.1 COVID-19: ICD-10-CM

## 2021-12-30 LAB — SARS-COV-2 RNA RESP QL NAA+PROBE: DETECTED

## 2022-03-08 ENCOUNTER — MED REC SCAN ONLY (OUTPATIENT)
Dept: FAMILY MEDICINE CLINIC | Facility: CLINIC | Age: 72
End: 2022-03-08

## 2022-03-08 ENCOUNTER — TELEPHONE (OUTPATIENT)
Dept: FAMILY MEDICINE CLINIC | Facility: CLINIC | Age: 72
End: 2022-03-08

## 2022-03-08 NOTE — TELEPHONE ENCOUNTER
Pt requesting letter from Dr. Meli Velazquez for his travels over seas. Pt will be leaving Friday and requires a letter from an \"authorized licensed healthcare provider stating traveler had Covid on 12/28/21 and that he is fully recovered and ready to travel now\". Pt needs letter before Thursday.     Please advise

## 2022-04-21 ENCOUNTER — APPOINTMENT (OUTPATIENT)
Dept: URBAN - METROPOLITAN AREA CLINIC 249 | Age: 72
Setting detail: DERMATOLOGY
End: 2022-04-23

## 2022-04-21 DIAGNOSIS — D18.0 HEMANGIOMA: ICD-10-CM

## 2022-04-21 DIAGNOSIS — L81.4 OTHER MELANIN HYPERPIGMENTATION: ICD-10-CM

## 2022-04-21 DIAGNOSIS — Z85.828 PERSONAL HISTORY OF OTHER MALIGNANT NEOPLASM OF SKIN: ICD-10-CM

## 2022-04-21 DIAGNOSIS — D22 MELANOCYTIC NEVI: ICD-10-CM

## 2022-04-21 PROBLEM — D18.01 HEMANGIOMA OF SKIN AND SUBCUTANEOUS TISSUE: Status: ACTIVE | Noted: 2022-04-21

## 2022-04-21 PROBLEM — D22.5 MELANOCYTIC NEVI OF TRUNK: Status: ACTIVE | Noted: 2022-04-21

## 2022-04-21 PROCEDURE — 99213 OFFICE O/P EST LOW 20 MIN: CPT

## 2022-04-21 PROCEDURE — OTHER COUNSELING: OTHER

## 2022-04-21 ASSESSMENT — LOCATION ZONE DERM
LOCATION ZONE: TRUNK
LOCATION ZONE: EAR

## 2022-04-21 ASSESSMENT — LOCATION SIMPLE DESCRIPTION DERM
LOCATION SIMPLE: ABDOMEN
LOCATION SIMPLE: RIGHT EAR
LOCATION SIMPLE: LEFT UPPER BACK
LOCATION SIMPLE: CHEST

## 2022-04-21 ASSESSMENT — LOCATION DETAILED DESCRIPTION DERM
LOCATION DETAILED: RIGHT INFERIOR CRUS OF ANTIHELIX
LOCATION DETAILED: PERIUMBILICAL SKIN
LOCATION DETAILED: LEFT MEDIAL SUPERIOR CHEST
LOCATION DETAILED: LEFT SUPERIOR UPPER BACK

## 2022-04-22 ENCOUNTER — HOSPITAL ENCOUNTER (OUTPATIENT)
Dept: LAB | Facility: HOSPITAL | Age: 72
Discharge: HOME | End: 2022-04-22
Attending: INTERNAL MEDICINE
Payer: MEDICARE

## 2022-04-22 LAB
ALBUMIN SERPL-MCNC: 3.4 G/DL (ref 3.4–5)
ALBUMIN/GLOB SERPL: 1 {RATIO} (ref 1–2)
ALP LIVER SERPL-CCNC: 76 U/L
ALT SERPL-CCNC: 26 U/L
ANION GAP SERPL CALC-SCNC: 3 MMOL/L (ref 0–18)
AST SERPL-CCNC: 19 U/L (ref 15–37)
BASOPHILS # BLD AUTO: 0.04 X10(3) UL (ref 0–0.2)
BASOPHILS NFR BLD AUTO: 1 %
BILIRUB SERPL-MCNC: 0.8 MG/DL (ref 0.1–2)
BUN BLD-MCNC: 16 MG/DL (ref 7–18)
CALCIUM BLD-MCNC: 9.3 MG/DL (ref 8.5–10.1)
CHLORIDE SERPL-SCNC: 109 MMOL/L (ref 98–112)
CHOLEST SERPL-MCNC: 124 MG/DL (ref ?–200)
CO2 SERPL-SCNC: 28 MMOL/L (ref 21–32)
CREAT BLD-MCNC: 1.07 MG/DL
EOSINOPHIL # BLD AUTO: 0.32 X10(3) UL (ref 0–0.7)
EOSINOPHIL NFR BLD AUTO: 7.8 %
ERYTHROCYTE [DISTWIDTH] IN BLOOD BY AUTOMATED COUNT: 13.5 %
FASTING PATIENT LIPID ANSWER: YES
FASTING STATUS PATIENT QL REPORTED: YES
GLOBULIN PLAS-MCNC: 3.4 G/DL (ref 2.8–4.4)
GLUCOSE BLD-MCNC: 100 MG/DL (ref 70–99)
HCT VFR BLD AUTO: 39 %
HDLC SERPL-MCNC: 41 MG/DL (ref 40–59)
HGB BLD-MCNC: 12.7 G/DL
IMM GRANULOCYTES # BLD AUTO: 0.01 X10(3) UL (ref 0–1)
IMM GRANULOCYTES NFR BLD: 0.2 %
LDLC SERPL CALC-MCNC: 72 MG/DL (ref ?–100)
LYMPHOCYTES # BLD AUTO: 1.08 X10(3) UL (ref 1–4)
LYMPHOCYTES NFR BLD AUTO: 26.4 %
MCH RBC QN AUTO: 28.4 PG (ref 26–34)
MCHC RBC AUTO-ENTMCNC: 32.6 G/DL (ref 31–37)
MCV RBC AUTO: 87.2 FL
MONOCYTES # BLD AUTO: 0.56 X10(3) UL (ref 0.1–1)
MONOCYTES NFR BLD AUTO: 13.7 %
NEUTROPHILS # BLD AUTO: 2.08 X10 (3) UL (ref 1.5–7.7)
NEUTROPHILS # BLD AUTO: 2.08 X10(3) UL (ref 1.5–7.7)
NEUTROPHILS NFR BLD AUTO: 50.9 %
NONHDLC SERPL-MCNC: 83 MG/DL (ref ?–130)
OSMOLALITY SERPL CALC.SUM OF ELEC: 291 MOSM/KG (ref 275–295)
PLATELET # BLD AUTO: 198 10(3)UL (ref 150–450)
POTASSIUM SERPL-SCNC: 3.9 MMOL/L (ref 3.5–5.1)
PROT SERPL-MCNC: 6.8 G/DL (ref 6.4–8.2)
RBC # BLD AUTO: 4.47 X10(6)UL
SODIUM SERPL-SCNC: 140 MMOL/L (ref 136–145)
TRIGL SERPL-MCNC: 48 MG/DL (ref 30–149)
VLDLC SERPL CALC-MCNC: 7 MG/DL (ref 0–30)
WBC # BLD AUTO: 4.1 X10(3) UL (ref 4–11)

## 2022-04-22 PROCEDURE — 80053 COMPREHEN METABOLIC PANEL: CPT | Performed by: INTERNAL MEDICINE

## 2022-04-22 PROCEDURE — 36415 COLL VENOUS BLD VENIPUNCTURE: CPT | Performed by: INTERNAL MEDICINE

## 2022-04-22 PROCEDURE — 80061 LIPID PANEL: CPT | Performed by: INTERNAL MEDICINE

## 2022-04-22 PROCEDURE — 85025 COMPLETE CBC W/AUTO DIFF WBC: CPT | Performed by: INTERNAL MEDICINE

## 2022-04-27 ENCOUNTER — APPOINTMENT (OUTPATIENT)
Dept: CARDIOLOGY | Age: 72
End: 2022-04-27

## 2022-06-02 ENCOUNTER — TELEPHONE (OUTPATIENT)
Dept: FAMILY MEDICINE CLINIC | Facility: CLINIC | Age: 72
End: 2022-06-02

## 2022-06-02 DIAGNOSIS — N52.31 ERECTILE DYSFUNCTION FOLLOWING RADICAL PROSTATECTOMY: ICD-10-CM

## 2022-06-02 RX ORDER — SILDENAFIL 100 MG/1
100 TABLET, FILM COATED ORAL
Qty: 30 TABLET | Refills: 1 | Status: SHIPPED | OUTPATIENT
Start: 2022-06-02 | End: 2022-09-14

## 2022-06-02 NOTE — TELEPHONE ENCOUNTER
Pt would like refill of Sildenafil Citrate 100 MG Oral Tab sen to [facility] [address], [address] [facility], [phone] thank you.

## 2022-06-03 DIAGNOSIS — N52.31 ERECTILE DYSFUNCTION FOLLOWING RADICAL PROSTATECTOMY: ICD-10-CM

## 2022-06-04 RX ORDER — SILDENAFIL 100 MG/1
TABLET, FILM COATED ORAL
Qty: 30 TABLET | Refills: 0 | OUTPATIENT
Start: 2022-06-04

## 2022-06-23 ENCOUNTER — OFFICE VISIT (OUTPATIENT)
Dept: FAMILY MEDICINE CLINIC | Facility: CLINIC | Age: 72
End: 2022-06-23
Payer: MEDICARE

## 2022-06-23 VITALS
TEMPERATURE: 99 F | BODY MASS INDEX: 28.49 KG/M2 | RESPIRATION RATE: 12 BRPM | WEIGHT: 190.19 LBS | DIASTOLIC BLOOD PRESSURE: 72 MMHG | HEIGHT: 68.5 IN | HEART RATE: 60 BPM | SYSTOLIC BLOOD PRESSURE: 128 MMHG

## 2022-06-23 DIAGNOSIS — H90.3 ASYMMETRIC SNHL (SENSORINEURAL HEARING LOSS): ICD-10-CM

## 2022-06-23 DIAGNOSIS — N52.31 ERECTILE DYSFUNCTION FOLLOWING RADICAL PROSTATECTOMY: ICD-10-CM

## 2022-06-23 DIAGNOSIS — C61 MALIGNANT NEOPLASM OF PROSTATE (HCC): ICD-10-CM

## 2022-06-23 DIAGNOSIS — E78.00 PURE HYPERCHOLESTEROLEMIA: ICD-10-CM

## 2022-06-23 DIAGNOSIS — Z00.00 ENCOUNTER FOR ANNUAL HEALTH EXAMINATION: Primary | ICD-10-CM

## 2022-06-23 DIAGNOSIS — I25.10 ATHEROSCLEROSIS OF NATIVE CORONARY ARTERY OF NATIVE HEART WITHOUT ANGINA PECTORIS: ICD-10-CM

## 2022-06-23 PROBLEM — M76.61 ACHILLES TENDINITIS OF BOTH LOWER EXTREMITIES: Status: ACTIVE | Noted: 2021-02-19

## 2022-06-23 PROBLEM — M67.02 CONTRACTURE OF BOTH ACHILLES TENDONS: Status: ACTIVE | Noted: 2021-02-19

## 2022-06-23 PROBLEM — I44.1 MOBITZ TYPE 1 SECOND DEGREE AV BLOCK: Status: ACTIVE | Noted: 2019-04-10

## 2022-06-23 PROBLEM — M67.01 CONTRACTURE OF BOTH ACHILLES TENDONS: Status: ACTIVE | Noted: 2021-02-19

## 2022-06-23 PROBLEM — M76.62 ACHILLES TENDINITIS OF BOTH LOWER EXTREMITIES: Status: ACTIVE | Noted: 2021-02-19

## 2022-06-23 PROBLEM — M77.30 POSTERIOR CALCANEAL EXOSTOSIS: Status: ACTIVE | Noted: 2021-02-19

## 2022-06-23 RX ORDER — CIPROFLOXACIN AND DEXAMETHASONE 3; 1 MG/ML; MG/ML
3 SUSPENSION/ DROPS AURICULAR (OTIC) 2 TIMES DAILY
COMMUNITY
Start: 2022-06-17 | End: 2022-06-27

## 2022-08-19 DIAGNOSIS — N52.31 ERECTILE DYSFUNCTION FOLLOWING RADICAL PROSTATECTOMY: ICD-10-CM

## 2022-08-19 NOTE — TELEPHONE ENCOUNTER
Pt called to request a refill on    Sildenafil Citrate 100 MG Oral Tab and    MUSE 1000 MCG Urethral Pellet (Discontinued)    Pt doesn't want the refills sent over to a pharmacy. Pt wants to pick the prescription refills up. Pt will like a call once that's been completed.

## 2022-08-19 NOTE — TELEPHONE ENCOUNTER
Web info sts Wright urethral pellet is no longer available from . Call to [place] confirms this info. She sts has advised pt of this several times before. Last OV 6/23/22 cpx-advised return in 6 months  Sildenafil last ordered 6/2/22 #30 w RFx1  MUSE last ordered 2/18/19-no longer available from [facility]    Call to pt to confirm desired pharmacy-[facility] st[facility] but requests paper prescription to  in our ofc-do not send electronically. \"That way is easier to obtain med if he needs to shop around for better price. \"    Pt then sts believes he still has refill left on sildenafil. He will call pharmacy. Advised pt of above info re MUSE. Pt sts when he has been unable to obtain MUSE in the past, dr Ayan Patel had him use sildenafil AND cialis. Does not recall dose but requests highest available dose of cialis and  REQUESTS PRINTED RX for same reason as above. requests call when printed rx is ready to pic up in ofc. Advised dr Ayan Patel is out of ofc today/returns 8/22. Will forward for review. Patient voices understanding/agrees with plan/no further questions. **see above and advise-thanks!

## 2022-08-20 NOTE — TELEPHONE ENCOUNTER
I do not believe I told the patient to take both Cialis and sildenafil together at the same time. Either or would be fine but not both. Have him notify us if he does have the refill of sildenafil available to him. Be sure the patient knows about the program through "[employer]"

## 2022-08-22 NOTE — TELEPHONE ENCOUNTER
S/w Rj Diver.   He sts he was taking Cialis 25mg, but requests a higher dose if possible as this was not always effective

## 2022-08-22 NOTE — TELEPHONE ENCOUNTER
S/w Nelly Mcadams. He sts he does not use sildenafil and cialis together, but alternates them as needed at least 24 hrs apart as \"sometimes one works better than the other\"    Cialis not previously prescribed by . Nelly Mcadams says he was receiving Cialis through a separate \"program\"  He still has pills remaining of Sildenafil       He is asking for Cialis prescription at highest dose possible. Is familiar with [facility]. Would like it sent to either [facility] in [place].

## 2022-09-01 DIAGNOSIS — N52.31 ERECTILE DYSFUNCTION FOLLOWING RADICAL PROSTATECTOMY: ICD-10-CM

## 2022-09-01 DIAGNOSIS — C61 MALIGNANT NEOPLASM OF PROSTATE (HCC): ICD-10-CM

## 2022-09-13 ENCOUNTER — PATIENT MESSAGE (OUTPATIENT)
Dept: FAMILY MEDICINE CLINIC | Facility: CLINIC | Age: 72
End: 2022-09-13

## 2022-09-14 RX ORDER — TADALAFIL 20 MG/1
20 TABLET ORAL
Qty: 30 TABLET | Refills: 0 | Status: SHIPPED | OUTPATIENT
Start: 2022-09-14

## 2022-09-15 ENCOUNTER — PATIENT MESSAGE (OUTPATIENT)
Dept: FAMILY MEDICINE CLINIC | Facility: CLINIC | Age: 72
End: 2022-09-15

## 2022-09-15 NOTE — TELEPHONE ENCOUNTER
I spoke with pt. I informed him the refill for tadalafil was sent to [address] [place] [address] yesterday. Pt verbalized understanding.

## 2022-10-20 ENCOUNTER — APPOINTMENT (OUTPATIENT)
Dept: URBAN - METROPOLITAN AREA CLINIC 249 | Age: 72
Setting detail: DERMATOLOGY
End: 2022-10-21

## 2022-10-20 DIAGNOSIS — R21 RASH AND OTHER NONSPECIFIC SKIN ERUPTION: ICD-10-CM

## 2022-10-20 DIAGNOSIS — L91.0 HYPERTROPHIC SCAR: ICD-10-CM

## 2022-10-20 DIAGNOSIS — L91.8 OTHER HYPERTROPHIC DISORDERS OF THE SKIN: ICD-10-CM

## 2022-10-20 DIAGNOSIS — L82.1 OTHER SEBORRHEIC KERATOSIS: ICD-10-CM

## 2022-10-20 DIAGNOSIS — L57.0 ACTINIC KERATOSIS: ICD-10-CM

## 2022-10-20 DIAGNOSIS — L81.4 OTHER MELANIN HYPERPIGMENTATION: ICD-10-CM

## 2022-10-20 DIAGNOSIS — D22 MELANOCYTIC NEVI: ICD-10-CM

## 2022-10-20 DIAGNOSIS — Z86.14 PERSONAL HISTORY OF METHICILLIN RESISTANT STAPHYLOCOCCUS AUREUS INFECTION: ICD-10-CM

## 2022-10-20 PROBLEM — D22.5 MELANOCYTIC NEVI OF TRUNK: Status: ACTIVE | Noted: 2022-10-20

## 2022-10-20 PROCEDURE — 99213 OFFICE O/P EST LOW 20 MIN: CPT | Mod: 25

## 2022-10-20 PROCEDURE — OTHER MIPS QUALITY: OTHER

## 2022-10-20 PROCEDURE — OTHER LIQUID NITROGEN: OTHER

## 2022-10-20 PROCEDURE — 17110 DESTRUCT B9 LESION 1-14: CPT

## 2022-10-20 PROCEDURE — OTHER PRESCRIPTION MEDICATION MANAGEMENT: OTHER

## 2022-10-20 PROCEDURE — OTHER COUNSELING: OTHER

## 2022-10-20 PROCEDURE — OTHER OTHER: OTHER

## 2022-10-20 PROCEDURE — OTHER PRESCRIPTION: OTHER

## 2022-10-20 RX ORDER — KETOCONAZOLE 20 MG/G
CREAM TOPICAL
Qty: 30 | Refills: 3 | Status: ERX | COMMUNITY
Start: 2022-10-20

## 2022-10-20 ASSESSMENT — LOCATION SIMPLE DESCRIPTION DERM
LOCATION SIMPLE: LEFT UPPER BACK
LOCATION SIMPLE: CHEST
LOCATION SIMPLE: LEFT UPPER ARM
LOCATION SIMPLE: RIGHT PLANTAR SURFACE
LOCATION SIMPLE: ABDOMEN
LOCATION SIMPLE: LEFT PLANTAR SURFACE
LOCATION SIMPLE: RIGHT TEMPLE

## 2022-10-20 ASSESSMENT — LOCATION DETAILED DESCRIPTION DERM
LOCATION DETAILED: RIGHT MID TEMPLE
LOCATION DETAILED: LEFT PROXIMAL POSTERIOR UPPER ARM
LOCATION DETAILED: EPIGASTRIC SKIN
LOCATION DETAILED: RIGHT LATERAL SUPERIOR CHEST
LOCATION DETAILED: LEFT SUPERIOR UPPER BACK
LOCATION DETAILED: RIGHT INFERIOR TEMPLE
LOCATION DETAILED: LEFT PLANTAR FOREFOOT OVERLYING 4TH METATARSAL
LOCATION DETAILED: PERIUMBILICAL SKIN
LOCATION DETAILED: RIGHT MEDIAL INFERIOR CHEST
LOCATION DETAILED: RIGHT PLANTAR FOREFOOT OVERLYING 5TH METATARSAL

## 2022-10-20 ASSESSMENT — SCAR ASSESSEMENT OVERALL: SCAR ASSESSMENT: 3.5 (NODULAR SCAR, ERYTHEMA)

## 2022-10-20 ASSESSMENT — LOCATION ZONE DERM
LOCATION ZONE: TRUNK
LOCATION ZONE: ARM
LOCATION ZONE: FEET
LOCATION ZONE: FACE

## 2022-10-20 ASSESSMENT — SEVERITY ASSESSMENT: SEVERITY: MILD TO MODERATE

## 2022-10-20 ASSESSMENT — BSA RASH: BSA RASH: 1

## 2022-10-20 NOTE — PROCEDURE: OTHER
Render Risk Assessment In Note?: no
Detail Level: Zone
Note Text (......Xxx Chief Complaint.): This diagnosis correlates with the
Other (Free Text): Patient declined keloid injections today as he has had them in the past and states it doesn’t help for long

## 2022-10-20 NOTE — PROCEDURE: PRESCRIPTION MEDICATION MANAGEMENT
Detail Level: Zone
Render In Strict Bullet Format?: No
Initiate Treatment: ketoconazole 2 % topical cream \\nSig: Apply to aa 1-2 times daily

## 2022-10-20 NOTE — PROCEDURE: LIQUID NITROGEN
Render Note In Bullet Format When Appropriate: No
Show Aperture Variable?: Yes
Consent: The patient's consent was obtained including but not limited to risks of crusting, scabbing, blistering, scarring, darker or lighter pigmentary change, recurrence, incomplete removal and infection.
Post-Care Instructions: I reviewed with the patient in detail post-care instructions. Patient is to wear sunprotection, and avoid picking at any of the treated lesions. Pt may apply Vaseline to crusted or scabbing areas.
Spray Paint Text: The liquid nitrogen was applied to the skin utilizing a spray paint frosting technique.
Detail Level: Detailed
Medical Necessity Clause: This procedure was medically necessary because the lesions that were treated were:
Medical Necessity Information: It is in your best interest to select a reason for this procedure from the list below. All of these items fulfill various CMS LCD requirements except the new and changing color options.

## 2022-10-20 NOTE — PROCEDURE: MIPS QUALITY
Quality 402: Tobacco Use And Help With Quitting Among Adolescents: Patient screened for tobacco and never smoked
Quality 110: Preventive Care And Screening: Influenza Immunization: Influenza Immunization Administered during Influenza season
Quality 111:Pneumonia Vaccination Status For Older Adults: Pneumococcal vaccine (PPSV23) administered on or after patient’s 60th birthday and before the end of the measurement period
Detail Level: Detailed
Quality 431: Preventive Care And Screening: Unhealthy Alcohol Use - Screening: Patient not identified as an unhealthy alcohol user when screened for unhealthy alcohol use using a systematic screening method
Quality 130: Documentation Of Current Medications In The Medical Record: Current Medications Documented

## 2023-01-23 ENCOUNTER — TELEPHONE (OUTPATIENT)
Dept: FAMILY MEDICINE CLINIC | Facility: CLINIC | Age: 73
End: 2023-01-23

## 2023-01-23 NOTE — TELEPHONE ENCOUNTER
Left elbow has lack of strength, and tenderness. Onset was a few weeks ago, needs to speak with nurse. Denies any pain or heart symtoms.   Pls call

## 2023-01-23 NOTE — TELEPHONE ENCOUNTER
S/W pt Left elbow pain r/t forearm, feels weak, trouble with . Rates 1-2 in pain scale and dull pain. Sx x 1 month off and on, but has been more consistent in the last 2 weeks. Denies any known injuries. Pt would like to see you. Please advise of when I can schedule pt with you. OK to use SDA?

## 2023-01-26 ENCOUNTER — OFFICE VISIT (OUTPATIENT)
Dept: FAMILY MEDICINE CLINIC | Facility: CLINIC | Age: 73
End: 2023-01-26
Payer: MEDICARE

## 2023-01-26 VITALS
SYSTOLIC BLOOD PRESSURE: 116 MMHG | BODY MASS INDEX: 29.21 KG/M2 | HEART RATE: 64 BPM | HEIGHT: 68.5 IN | TEMPERATURE: 97 F | DIASTOLIC BLOOD PRESSURE: 54 MMHG | RESPIRATION RATE: 16 BRPM | WEIGHT: 195 LBS

## 2023-01-26 DIAGNOSIS — N52.31 ERECTILE DYSFUNCTION FOLLOWING RADICAL PROSTATECTOMY: ICD-10-CM

## 2023-01-26 DIAGNOSIS — M77.11 RIGHT LATERAL EPICONDYLITIS: Primary | ICD-10-CM

## 2023-01-26 PROCEDURE — 99213 OFFICE O/P EST LOW 20 MIN: CPT | Performed by: FAMILY MEDICINE

## 2023-01-26 RX ORDER — SODIUM FLUORIDE 6 MG/ML
PASTE, DENTIFRICE DENTAL
COMMUNITY
Start: 2023-01-20

## 2023-01-30 RX ORDER — TADALAFIL 20 MG/1
20 TABLET ORAL
Qty: 30 TABLET | Refills: 0 | Status: SHIPPED | OUTPATIENT
Start: 2023-01-30

## 2023-02-01 ENCOUNTER — PATIENT MESSAGE (OUTPATIENT)
Dept: FAMILY MEDICINE CLINIC | Facility: CLINIC | Age: 73
End: 2023-02-01

## 2023-02-01 DIAGNOSIS — N52.31 ERECTILE DYSFUNCTION FOLLOWING RADICAL PROSTATECTOMY: Primary | ICD-10-CM

## 2023-02-02 NOTE — TELEPHONE ENCOUNTER
From: Noam Gonzalez  Sent: 2/2/2023 10:25 AM CST  To: Renae Shabazz MD  Subject: Blood test    ----- Message from Bo Cantu LPN sent at 8/0/0208 10:25 AM CST -----  Pt requesting testosterone be added to his current labs. I pended a total if you agree?     ----- Message sent from Pamela Ballesteros LPN to Kelsey Grey at 2/2/2023 10:25 AM -----   I will have him order. 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 For the MUSE, looks like he sent a qty of 30 pellets. Not sure how many boxes that will equate to. Sincerely,  Deirdre Richards LPN      ----- Message -----   From:Tony Crum   Sent:2/2/2023 10:12 AM CST   To:Patient Medical Advice Request Message List   Subject:Blood test    Thanks for getting back to me. I have ED and want to track testosterone levels as I age to see if this may be an issue. Dr. Cheryle Kennedy is aware of this issue. Also was the prescription for Muse, 1,000 mcg, for 2 or 3 boxes (6 units per box) per quarter?       ----- Message -----   From:Rozina PAYTON   Sent:2/2/2023 9:43 AM CST   To:Tony Crum   Subject:Blood test    Hi Marcelino [facility],  You have labs in the system from June that were due in October. Cmp, lipid, psa. Is there a reason you are requesting a testosterone? We have to attach to a diagnosis to justify ordering. Please let us know and I can see if Dr Fransisco Rinaldi can order. Sincerely,  Deirdre Richards LPN      ----- Message -----   From:Tony Crum   Sent:2/1/2023 10:57 AM CST   To:Gildardo Kennedy MD   Subject:Blood test    I saw Dr. Cheryle Kennedy last week and he said he was sending a script for blood testing. Can you add testosterone to the blood script? Where do I see the blood test request so I can set a time to complete the testing?

## 2023-04-20 ENCOUNTER — PATIENT MESSAGE (OUTPATIENT)
Dept: FAMILY MEDICINE CLINIC | Facility: CLINIC | Age: 73
End: 2023-04-20

## 2023-04-20 NOTE — TELEPHONE ENCOUNTER
From: Luiza Owen  To: Aysha Puckett MD  Sent: 4/20/2023 10:28 AM CDT  Subject: Ongoing ED issue and non availability of Muse. Dr. Krysten Sinclair, since I still cannot obtain any Muse, which is the most consistent remedy for my surgery related ED, I would like to start investigating the possibility for self administered penal injections. Can you recommend a solid urologist that works with males and ED issues to begin this process?  Thanks

## 2023-04-25 ENCOUNTER — HOSPITAL ENCOUNTER (OUTPATIENT)
Dept: CV DIAGNOSTICS | Facility: HOSPITAL | Age: 73
Discharge: HOME | End: 2023-04-25
Attending: INTERNAL MEDICINE
Payer: MEDICARE

## 2023-04-25 ENCOUNTER — HOSPITAL ENCOUNTER (OUTPATIENT)
Dept: LAB | Facility: HOSPITAL | Age: 73
Discharge: HOME | End: 2023-04-25
Attending: INTERNAL MEDICINE
Payer: MEDICARE

## 2023-04-25 DIAGNOSIS — E78.00 PURE HYPERCHOLESTEROLEMIA: ICD-10-CM

## 2023-04-25 DIAGNOSIS — I25.10 ATHEROSCLEROSIS OF NATIVE CORONARY ARTERY OF NATIVE HEART WITHOUT ANGINA PECTORIS: ICD-10-CM

## 2023-04-25 DIAGNOSIS — I25.10 CAD (CORONARY ARTERY DISEASE), NATIVE CORONARY ARTERY: ICD-10-CM

## 2023-04-25 DIAGNOSIS — I44.1 MOBITZ TYPE 1 SECOND DEGREE AV BLOCK: ICD-10-CM

## 2023-04-25 DIAGNOSIS — N52.31 ERECTILE DYSFUNCTION FOLLOWING RADICAL PROSTATECTOMY: ICD-10-CM

## 2023-04-25 DIAGNOSIS — Z82.49 FAMILY HISTORY OF CABG: ICD-10-CM

## 2023-04-25 DIAGNOSIS — C61 MALIGNANT NEOPLASM OF PROSTATE (HCC): ICD-10-CM

## 2023-04-25 DIAGNOSIS — C61 PROSTATE CANCER (HCC): ICD-10-CM

## 2023-04-25 LAB
ALBUMIN SERPL-MCNC: 3.5 G/DL (ref 3.4–5)
ALBUMIN/GLOB SERPL: 0.9 {RATIO} (ref 1–2)
ALP LIVER SERPL-CCNC: 74 U/L
ALT SERPL-CCNC: 38 U/L
ANION GAP SERPL CALC-SCNC: 3 MMOL/L (ref 0–18)
AST SERPL-CCNC: 29 U/L (ref 15–37)
BASOPHILS # BLD AUTO: 0.04 X10(3) UL (ref 0–0.2)
BASOPHILS NFR BLD AUTO: 1 %
BILIRUB SERPL-MCNC: 0.6 MG/DL (ref 0.1–2)
BUN BLD-MCNC: 17 MG/DL (ref 7–18)
CALCIUM BLD-MCNC: 9.1 MG/DL (ref 8.5–10.1)
CHLORIDE SERPL-SCNC: 109 MMOL/L (ref 98–112)
CHOLEST SERPL-MCNC: 128 MG/DL (ref ?–200)
CO2 SERPL-SCNC: 28 MMOL/L (ref 21–32)
CREAT BLD-MCNC: 1.03 MG/DL
EOSINOPHIL # BLD AUTO: 0.57 X10(3) UL (ref 0–0.7)
EOSINOPHIL NFR BLD AUTO: 14.8 %
ERYTHROCYTE [DISTWIDTH] IN BLOOD BY AUTOMATED COUNT: 14.4 %
FASTING PATIENT LIPID ANSWER: YES
FASTING STATUS PATIENT QL REPORTED: YES
GFR SERPLBLD BASED ON 1.73 SQ M-ARVRAT: 77 ML/MIN/1.73M2 (ref 60–?)
GLOBULIN PLAS-MCNC: 3.7 G/DL (ref 2.8–4.4)
GLUCOSE BLD-MCNC: 88 MG/DL (ref 70–99)
HCT VFR BLD AUTO: 39.9 %
HDLC SERPL-MCNC: 47 MG/DL (ref 40–59)
HGB BLD-MCNC: 12.5 G/DL
IMM GRANULOCYTES # BLD AUTO: 0 X10(3) UL (ref 0–1)
IMM GRANULOCYTES NFR BLD: 0 %
LDLC SERPL CALC-MCNC: 69 MG/DL (ref ?–100)
LYMPHOCYTES # BLD AUTO: 1.05 X10(3) UL (ref 1–4)
LYMPHOCYTES NFR BLD AUTO: 27.3 %
MCH RBC QN AUTO: 25.1 PG (ref 26–34)
MCHC RBC AUTO-ENTMCNC: 31.3 G/DL (ref 31–37)
MCV RBC AUTO: 80 FL
MONOCYTES # BLD AUTO: 0.47 X10(3) UL (ref 0.1–1)
MONOCYTES NFR BLD AUTO: 12.2 %
NEUTROPHILS # BLD AUTO: 1.72 X10 (3) UL (ref 1.5–7.7)
NEUTROPHILS # BLD AUTO: 1.72 X10(3) UL (ref 1.5–7.7)
NEUTROPHILS NFR BLD AUTO: 44.7 %
NONHDLC SERPL-MCNC: 81 MG/DL (ref ?–130)
OSMOLALITY SERPL CALC.SUM OF ELEC: 291 MOSM/KG (ref 275–295)
PLATELET # BLD AUTO: 285 10(3)UL (ref 150–450)
POTASSIUM SERPL-SCNC: 3.8 MMOL/L (ref 3.5–5.1)
PROT SERPL-MCNC: 7.2 G/DL (ref 6.4–8.2)
PSA SERPL-MCNC: <0.01 NG/ML (ref ?–4)
RBC # BLD AUTO: 4.99 X10(6)UL
SODIUM SERPL-SCNC: 140 MMOL/L (ref 136–145)
TESTOST SERPL-MCNC: 363.46 NG/DL
TRIGL SERPL-MCNC: 55 MG/DL (ref 30–149)
VLDLC SERPL CALC-MCNC: 8 MG/DL (ref 0–30)
WBC # BLD AUTO: 3.9 X10(3) UL (ref 4–11)

## 2023-04-25 PROCEDURE — 85025 COMPLETE CBC W/AUTO DIFF WBC: CPT | Performed by: INTERNAL MEDICINE

## 2023-04-25 PROCEDURE — 80061 LIPID PANEL: CPT

## 2023-04-25 PROCEDURE — 84403 ASSAY OF TOTAL TESTOSTERONE: CPT

## 2023-04-25 PROCEDURE — 93017 CV STRESS TEST TRACING ONLY: CPT | Performed by: INTERNAL MEDICINE

## 2023-04-25 PROCEDURE — 93018 CV STRESS TEST I&R ONLY: CPT | Performed by: INTERNAL MEDICINE

## 2023-04-25 PROCEDURE — 84153 ASSAY OF PSA TOTAL: CPT

## 2023-04-25 PROCEDURE — 36415 COLL VENOUS BLD VENIPUNCTURE: CPT | Performed by: INTERNAL MEDICINE

## 2023-04-25 PROCEDURE — 80053 COMPREHEN METABOLIC PANEL: CPT

## 2023-04-25 PROCEDURE — 93350 STRESS TTE ONLY: CPT | Performed by: INTERNAL MEDICINE

## 2023-05-08 NOTE — TELEPHONE ENCOUNTER
DICLOFENAC SOD EC 50 MG TAB    Please see pended medications. Please sign if appropriate.       Thank you      Last OV: 01/26/2023      Last refill: 01/26/2023 for 01/26/2023 for 20/2 refills    Upcoming appt is scheduled for 06/26/2023

## 2023-05-30 ENCOUNTER — OFFICE VISIT (OUTPATIENT)
Dept: SURGERY | Facility: CLINIC | Age: 73
End: 2023-05-30

## 2023-05-30 DIAGNOSIS — N39.43 POST-VOID DRIBBLING: ICD-10-CM

## 2023-05-30 DIAGNOSIS — R82.90 URINE FINDING: Primary | ICD-10-CM

## 2023-05-30 DIAGNOSIS — Z85.46 HISTORY OF PROSTATE CANCER: ICD-10-CM

## 2023-05-30 DIAGNOSIS — N52.9 ERECTILE DYSFUNCTION, UNSPECIFIED ERECTILE DYSFUNCTION TYPE: ICD-10-CM

## 2023-05-30 LAB
APPEARANCE: CLEAR
BILIRUBIN: NEGATIVE
GLUCOSE (URINE DIPSTICK): NEGATIVE MG/DL
LEUKOCYTES: NEGATIVE
MULTISTIX LOT#: NORMAL NUMERIC
NITRITE, URINE: NEGATIVE
OCCULT BLOOD: NEGATIVE
PH, URINE: 6.5 (ref 4.5–8)
SPECIFIC GRAVITY: 1.02 (ref 1–1.03)
URINE-COLOR: YELLOW
UROBILINOGEN,SEMI-QN: 0.2 MG/DL (ref 0–1.9)

## 2023-05-30 PROCEDURE — 99213 OFFICE O/P EST LOW 20 MIN: CPT

## 2023-05-30 PROCEDURE — 81003 URINALYSIS AUTO W/O SCOPE: CPT

## 2023-05-31 ENCOUNTER — TELEPHONE (OUTPATIENT)
Dept: SURGERY | Facility: CLINIC | Age: 73
End: 2023-05-31

## 2023-06-22 ENCOUNTER — OFFICE VISIT (OUTPATIENT)
Dept: SURGERY | Facility: CLINIC | Age: 73
End: 2023-06-22

## 2023-06-22 DIAGNOSIS — N52.9 ERECTILE DYSFUNCTION, UNSPECIFIED ERECTILE DYSFUNCTION TYPE: Primary | ICD-10-CM

## 2023-06-22 PROCEDURE — 99214 OFFICE O/P EST MOD 30 MIN: CPT

## 2023-06-26 ENCOUNTER — OFFICE VISIT (OUTPATIENT)
Dept: FAMILY MEDICINE CLINIC | Facility: CLINIC | Age: 73
End: 2023-06-26
Payer: MEDICARE

## 2023-06-26 ENCOUNTER — LAB ENCOUNTER (OUTPATIENT)
Dept: LAB | Age: 73
End: 2023-06-26
Attending: FAMILY MEDICINE
Payer: MEDICARE

## 2023-06-26 VITALS
HEIGHT: 68.5 IN | DIASTOLIC BLOOD PRESSURE: 62 MMHG | SYSTOLIC BLOOD PRESSURE: 124 MMHG | TEMPERATURE: 98 F | BODY MASS INDEX: 29.52 KG/M2 | WEIGHT: 197 LBS | HEART RATE: 64 BPM | RESPIRATION RATE: 16 BRPM

## 2023-06-26 DIAGNOSIS — I25.10 ATHEROSCLEROSIS OF NATIVE CORONARY ARTERY OF NATIVE HEART WITHOUT ANGINA PECTORIS: ICD-10-CM

## 2023-06-26 DIAGNOSIS — E78.00 PURE HYPERCHOLESTEROLEMIA: ICD-10-CM

## 2023-06-26 DIAGNOSIS — D64.9 MILD ANEMIA: ICD-10-CM

## 2023-06-26 DIAGNOSIS — Z00.00 ENCOUNTER FOR ANNUAL HEALTH EXAMINATION: Primary | ICD-10-CM

## 2023-06-26 DIAGNOSIS — N52.31 ERECTILE DYSFUNCTION FOLLOWING RADICAL PROSTATECTOMY: ICD-10-CM

## 2023-06-26 DIAGNOSIS — C61 MALIGNANT NEOPLASM OF PROSTATE (HCC): ICD-10-CM

## 2023-06-26 LAB
BASOPHILS # BLD AUTO: 0.04 X10(3) UL (ref 0–0.2)
BASOPHILS NFR BLD AUTO: 0.8 %
EOSINOPHIL # BLD AUTO: 0.45 X10(3) UL (ref 0–0.7)
EOSINOPHIL NFR BLD AUTO: 8.7 %
ERYTHROCYTE [DISTWIDTH] IN BLOOD BY AUTOMATED COUNT: 17.5 %
HCT VFR BLD AUTO: 39.6 %
HGB BLD-MCNC: 11.9 G/DL
IMM GRANULOCYTES # BLD AUTO: 0.01 X10(3) UL (ref 0–1)
IMM GRANULOCYTES NFR BLD: 0.2 %
LYMPHOCYTES # BLD AUTO: 1.18 X10(3) UL (ref 1–4)
LYMPHOCYTES NFR BLD AUTO: 22.7 %
MCH RBC QN AUTO: 24.3 PG (ref 26–34)
MCHC RBC AUTO-ENTMCNC: 30.1 G/DL (ref 31–37)
MCV RBC AUTO: 81 FL
MONOCYTES # BLD AUTO: 0.62 X10(3) UL (ref 0.1–1)
MONOCYTES NFR BLD AUTO: 11.9 %
NEUTROPHILS # BLD AUTO: 2.89 X10 (3) UL (ref 1.5–7.7)
NEUTROPHILS # BLD AUTO: 2.89 X10(3) UL (ref 1.5–7.7)
NEUTROPHILS NFR BLD AUTO: 55.7 %
PLATELET # BLD AUTO: 241 10(3)UL (ref 150–450)
RBC # BLD AUTO: 4.89 X10(6)UL
WBC # BLD AUTO: 5.2 X10(3) UL (ref 4–11)

## 2023-06-26 PROCEDURE — 99213 OFFICE O/P EST LOW 20 MIN: CPT | Performed by: FAMILY MEDICINE

## 2023-06-26 PROCEDURE — 85025 COMPLETE CBC W/AUTO DIFF WBC: CPT

## 2023-06-26 PROCEDURE — G0439 PPPS, SUBSEQ VISIT: HCPCS | Performed by: FAMILY MEDICINE

## 2023-06-27 ENCOUNTER — PATIENT MESSAGE (OUTPATIENT)
Dept: FAMILY MEDICINE CLINIC | Facility: CLINIC | Age: 73
End: 2023-06-27

## 2023-06-28 ENCOUNTER — LAB ENCOUNTER (OUTPATIENT)
Dept: LAB | Age: 73
End: 2023-06-28
Attending: FAMILY MEDICINE
Payer: MEDICARE

## 2023-06-28 DIAGNOSIS — D64.9 MILD ANEMIA: ICD-10-CM

## 2023-06-28 DIAGNOSIS — D64.9 MILD ANEMIA: Primary | ICD-10-CM

## 2023-06-28 LAB
DEPRECATED HBV CORE AB SER IA-ACNC: 5.6 NG/ML
IRON SERPL-MCNC: 21 UG/DL

## 2023-06-28 PROCEDURE — 83540 ASSAY OF IRON: CPT

## 2023-06-28 PROCEDURE — 82728 ASSAY OF FERRITIN: CPT

## 2023-06-29 RX ORDER — ASPIRIN 325 MG
TABLET ORAL
Refills: 0 | COMMUNITY
Start: 2023-06-29

## 2023-06-29 RX ORDER — FERROUS SULFATE 137(45) MG
TABLET, EXTENDED RELEASE ORAL DAILY
Refills: 0 | COMMUNITY
Start: 2023-06-29

## 2023-07-10 ENCOUNTER — PATIENT MESSAGE (OUTPATIENT)
Dept: SURGERY | Facility: CLINIC | Age: 73
End: 2023-07-10

## 2023-07-19 ENCOUNTER — TELEPHONE (OUTPATIENT)
Dept: SURGERY | Facility: CLINIC | Age: 73
End: 2023-07-19

## 2023-07-19 NOTE — TELEPHONE ENCOUNTER
This encounter is now closed. See previous telephone encounter. Order was faxed today. MYAH made aware. Patient updated via [facility].

## 2023-07-19 NOTE — TELEPHONE ENCOUNTER
RN received an order from [facility], [place]. Order reviewed. RN faxed the Trimix order to [facility] on 07/19/23    Trimix #8  Inject 30 units intracavernosally as instructed  Increase or decreased by 5 units until desired effect achieved.    Maximum dose 40 units   Refills: PRN  Syringe: 1cc: 31 gauge x 5/16\" insulin syringes  Qty: 20

## 2023-08-22 NOTE — TELEPHONE ENCOUNTER
From: Venecia Velazquez  To: Win Jones MD  Sent: 10/28/2021 2:10 PM CDT  Subject: Medication - Muse 1000 mcg - insurance exception request    Dr. Tavo Chandra and Robbi Sacks, please review the attached summary of my continuing need for the Muse to relieve the E Subjective:      Patient ID: Karla Quintana is a 80 y.o. female who presents today for:     Chief Complaint   Patient presents with    Incontinence     Patient presents today to follow up on incontinence. Patient states she is still having urinary leakage. HPI pt reports that urinary incontinent has continued. It is worse going from sitting to standing. Also happens other times like sneezing, coughing, etc. She reports no burning or urgency. She reports she has no nocturnal incontinence. Past Medical History:   Diagnosis Date    Chronic back pain     Depression     Hyperglycemia     Hyperlipidemia     Hypertension     Osteoarthritis     Stress incontinence 2018     Past Surgical History:   Procedure Laterality Date    EYE SURGERY      cataracts    FRACTURE SURGERY Right 04/01/2018    Right ankle     Family History   Problem Relation Age of Onset    Cancer Mother 80        stomache    Other Father 59        Car accident    Cancer Sister         leukemia    Diabetes Paternal Aunt      Social History     Socioeconomic History    Marital status:      Spouse name: Not on file    Number of children: Not on file    Years of education: Not on file    Highest education level: Not on file   Occupational History    Not on file   Tobacco Use    Smoking status: Every Day     Packs/day: 0.25     Years: 30.00     Pack years: 7.50     Types: Cigarettes    Smokeless tobacco: Never   Vaping Use    Vaping Use: Never used   Substance and Sexual Activity    Alcohol use:  Yes     Alcohol/week: 0.0 standard drinks     Comment: occassionally    Drug use: No    Sexual activity: Not Currently     Partners: Female   Other Topics Concern    Not on file   Social History Narrative    Not on file     Social Determinants of Health     Financial Resource Strain: Low Risk     Difficulty of Paying Living Expenses: Not hard at all   Food Insecurity: No Food Insecurity    Worried About [place] in the Last Year: Never

## 2023-10-02 ENCOUNTER — TELEPHONE (OUTPATIENT)
Dept: FAMILY MEDICINE CLINIC | Facility: CLINIC | Age: 73
End: 2023-10-02

## 2023-10-02 NOTE — TELEPHONE ENCOUNTER
I spoke with pt. The finger is red, swollen and has some bloody drainage. Pt is in [place]. I advised pt to go to a [facility] or  there to have his finger looked at. He made need an antibiotic. Pt agreed to plan and verbalized understanding. Pt is keeping his appt with Dr. Natalie Johnson for 10/06.  Pt. Agreed to plan and verbalized understanding

## 2023-10-02 NOTE — TELEPHONE ENCOUNTER
Pt has a red warm swollen ring finger on his left hand. It is weeping some blood right along the nail. It is sore. He is out of town right now. Appt 10/6. Please advise if ok to wait. Thank you.

## 2023-10-09 ENCOUNTER — OFFICE VISIT (OUTPATIENT)
Dept: FAMILY MEDICINE CLINIC | Facility: CLINIC | Age: 73
End: 2023-10-09
Payer: MEDICARE

## 2023-10-09 VITALS
WEIGHT: 198 LBS | SYSTOLIC BLOOD PRESSURE: 132 MMHG | BODY MASS INDEX: 29.66 KG/M2 | HEART RATE: 57 BPM | DIASTOLIC BLOOD PRESSURE: 62 MMHG | RESPIRATION RATE: 18 BRPM | TEMPERATURE: 97 F | HEIGHT: 68.5 IN

## 2023-10-09 DIAGNOSIS — Z00.00 HEALTHCARE MAINTENANCE: ICD-10-CM

## 2023-10-09 DIAGNOSIS — D64.9 MILD ANEMIA: ICD-10-CM

## 2023-10-09 DIAGNOSIS — L03.012 PARONYCHIA OF FINGER, LEFT: ICD-10-CM

## 2023-10-09 DIAGNOSIS — Z23 ENCOUNTER FOR IMMUNIZATION: ICD-10-CM

## 2023-10-09 PROBLEM — M77.31 CALCANEAL SPUR, RIGHT FOOT: Status: ACTIVE | Noted: 2022-04-27

## 2023-10-09 PROBLEM — I25.10 ARTERIOSCLEROSIS OF CORONARY ARTERY: Status: ACTIVE | Noted: 2019-04-10

## 2023-10-25 ENCOUNTER — APPOINTMENT (OUTPATIENT)
Dept: URBAN - METROPOLITAN AREA CLINIC 249 | Age: 73
Setting detail: DERMATOLOGY
End: 2023-10-31

## 2023-10-25 DIAGNOSIS — D18.0 HEMANGIOMA: ICD-10-CM

## 2023-10-25 DIAGNOSIS — L82.0 INFLAMED SEBORRHEIC KERATOSIS: ICD-10-CM

## 2023-10-25 DIAGNOSIS — L82.1 OTHER SEBORRHEIC KERATOSIS: ICD-10-CM

## 2023-10-25 DIAGNOSIS — D22 MELANOCYTIC NEVI: ICD-10-CM

## 2023-10-25 DIAGNOSIS — L81.4 OTHER MELANIN HYPERPIGMENTATION: ICD-10-CM

## 2023-10-25 DIAGNOSIS — L21.8 OTHER SEBORRHEIC DERMATITIS: ICD-10-CM

## 2023-10-25 DIAGNOSIS — L57.0 ACTINIC KERATOSIS: ICD-10-CM

## 2023-10-25 DIAGNOSIS — L91.8 OTHER HYPERTROPHIC DISORDERS OF THE SKIN: ICD-10-CM

## 2023-10-25 PROBLEM — D18.01 HEMANGIOMA OF SKIN AND SUBCUTANEOUS TISSUE: Status: ACTIVE | Noted: 2023-10-25

## 2023-10-25 PROBLEM — D22.72 MELANOCYTIC NEVI OF LEFT LOWER LIMB, INCLUDING HIP: Status: ACTIVE | Noted: 2023-10-25

## 2023-10-25 PROBLEM — D22.71 MELANOCYTIC NEVI OF RIGHT LOWER LIMB, INCLUDING HIP: Status: ACTIVE | Noted: 2023-10-25

## 2023-10-25 PROBLEM — D22.5 MELANOCYTIC NEVI OF TRUNK: Status: ACTIVE | Noted: 2023-10-25

## 2023-10-25 PROCEDURE — 17000 DESTRUCT PREMALG LESION: CPT | Mod: 59

## 2023-10-25 PROCEDURE — OTHER PRESCRIPTION: OTHER

## 2023-10-25 PROCEDURE — OTHER BENIGN DESTRUCTION COSMETIC: OTHER

## 2023-10-25 PROCEDURE — OTHER COUNSELING: OTHER

## 2023-10-25 PROCEDURE — 17110 DESTRUCT B9 LESION 1-14: CPT

## 2023-10-25 PROCEDURE — OTHER BENIGN DESTRUCTION COSMETIC MULTI: OTHER

## 2023-10-25 PROCEDURE — OTHER MIPS QUALITY: OTHER

## 2023-10-25 PROCEDURE — OTHER LIQUID NITROGEN: OTHER

## 2023-10-25 PROCEDURE — 99213 OFFICE O/P EST LOW 20 MIN: CPT | Mod: 25

## 2023-10-25 RX ORDER — TRIAMCINOLONE ACETONIDE 0.25 MG/G
CREAM TOPICAL
Qty: 80 | Refills: 1 | Status: ERX | COMMUNITY
Start: 2023-10-25

## 2023-10-25 ASSESSMENT — LOCATION DETAILED DESCRIPTION DERM
LOCATION DETAILED: LEFT PROXIMAL PRETIBIAL REGION
LOCATION DETAILED: LEFT PROXIMAL DORSAL FOREARM
LOCATION DETAILED: PERIUMBILICAL SKIN
LOCATION DETAILED: EPIGASTRIC SKIN
LOCATION DETAILED: PHILTRUM
LOCATION DETAILED: RIGHT INFERIOR MEDIAL UPPER BACK
LOCATION DETAILED: RIGHT MEDIAL MALAR CHEEK
LOCATION DETAILED: RIGHT AXILLARY VAULT
LOCATION DETAILED: LEFT AXILLARY VAULT
LOCATION DETAILED: RIGHT PROXIMAL POSTERIOR UPPER ARM
LOCATION DETAILED: RIGHT VENTRAL LATERAL DISTAL FOREARM
LOCATION DETAILED: RIGHT ANTERIOR DISTAL THIGH
LOCATION DETAILED: RIGHT PROXIMAL LATERAL POSTERIOR UPPER ARM
LOCATION DETAILED: LEFT MEDIAL MALAR CHEEK
LOCATION DETAILED: RIGHT PROXIMAL PRETIBIAL REGION
LOCATION DETAILED: RIGHT PROXIMAL LATERAL POSTERIOR UPPER ARM
LOCATION DETAILED: RIGHT ANTERIOR DISTAL UPPER ARM
LOCATION DETAILED: RIGHT DISTAL DORSAL FOREARM
LOCATION DETAILED: LEFT ANTERIOR SHOULDER
LOCATION DETAILED: LEFT ANTERIOR DISTAL THIGH
LOCATION DETAILED: RIGHT SUPERIOR MEDIAL UPPER BACK

## 2023-10-25 ASSESSMENT — LOCATION SIMPLE DESCRIPTION DERM
LOCATION SIMPLE: ABDOMEN
LOCATION SIMPLE: RIGHT AXILLARY VAULT
LOCATION SIMPLE: RIGHT THIGH
LOCATION SIMPLE: LEFT PRETIBIAL REGION
LOCATION SIMPLE: LEFT AXILLARY VAULT
LOCATION SIMPLE: RIGHT UPPER BACK
LOCATION SIMPLE: RIGHT PRETIBIAL REGION
LOCATION SIMPLE: UPPER LIP
LOCATION SIMPLE: RIGHT UPPER ARM
LOCATION SIMPLE: LEFT SHOULDER
LOCATION SIMPLE: RIGHT FOREARM
LOCATION SIMPLE: RIGHT CHEEK
LOCATION SIMPLE: LEFT FOREARM
LOCATION SIMPLE: RIGHT UPPER ARM
LOCATION SIMPLE: LEFT THIGH
LOCATION SIMPLE: LEFT CHEEK

## 2023-10-25 ASSESSMENT — LOCATION ZONE DERM
LOCATION ZONE: LEG
LOCATION ZONE: ARM
LOCATION ZONE: LIP
LOCATION ZONE: TRUNK
LOCATION ZONE: AXILLAE
LOCATION ZONE: FACE
LOCATION ZONE: ARM

## 2023-10-25 NOTE — PROCEDURE: BENIGN DESTRUCTION COSMETIC
Post-Care Instructions: I reviewed with the patient in detail post-care instructions. Patient is to wear sunprotection, and avoid picking at any of the treated lesions. Pt may apply Vaseline to crusted or scabbing areas.
Anesthesia Type: 1% Xylocaine with 1:200,000 epinephrine
Detail Level: Detailed
Anesthesia Volume In Cc: 2
Consent: The patient's consent was obtained including but not limited to risks of crusting, scabbing, blistering, scarring, darker or lighter pigmentary change, recurrence, incomplete removal and infection.

## 2023-10-25 NOTE — PROCEDURE: MIPS QUALITY
Quality 111:Pneumonia Vaccination Status For Older Adults: Patient received any pneumococcal conjugate or polysaccharide vaccine on or after their 60th birthday and before the end of the measurement period
Quality 402: Tobacco Use And Help With Quitting Among Adolescents: Patient screened for tobacco and never smoked
Detail Level: Detailed
Quality 110: Preventive Care And Screening: Influenza Immunization: Influenza Immunization previously received during influenza season
Quality 130: Documentation Of Current Medications In The Medical Record: Current Medications Documented
Quality 226: Preventive Care And Screening: Tobacco Use: Screening And Cessation Intervention: Patient screened for tobacco use and is an ex/non-smoker
Quality 431: Preventive Care And Screening: Unhealthy Alcohol Use - Screening: Patient not identified as an unhealthy alcohol user when screened for unhealthy alcohol use using a systematic screening method

## 2023-10-25 NOTE — PROCEDURE: LIQUID NITROGEN
Consent: The patient's consent was obtained including but not limited to risks of crusting, scabbing, blistering, scarring, darker or lighter pigmentary change, recurrence, incomplete removal and infection.
Post-Care Instructions: I reviewed with the patient in detail post-care instructions. Patient is to wear sunprotection, and avoid picking at any of the treated lesions. Pt may apply Vaseline to crusted or scabbing areas.
Detail Level: Detailed
Render Post-Care Instructions In Note?: no
Show Applicator Variable?: Yes
Duration Of Freeze Thaw-Cycle (Seconds): 0
Duration Of Freeze Thaw-Cycle (Seconds): 5-10
Medical Necessity Clause: This procedure was medically necessary because the lesions that were treated were:
Medical Necessity Information: It is in your best interest to select a reason for this procedure from the list below. All of these items fulfill various CMS LCD requirements except the new and changing color options.
Spray Paint Text: The liquid nitrogen was applied to the skin utilizing a spray paint frosting technique.
Number Of Freeze-Thaw Cycles: 1 freeze-thaw cycle

## 2023-10-25 NOTE — PROCEDURE: BENIGN DESTRUCTION COSMETIC MULTI
Detail Level: Zone
Post-Care Instructions: I reviewed with the patient in detail post-care instructions. Patient is to wear sunprotection, and avoid picking at any of the treated lesions. Pt may apply Vaseline to crusted or scabbing areas.
Consent: The patient's consent was obtained including but not limited to risks of crusting, scabbing, blistering, scarring, darker or lighter pigmentary change, recurrence, incomplete removal and infection.
Anesthesia Volume In Cc: 0.5
Total Number Of Lesions Treated: 5

## 2023-10-27 ENCOUNTER — LAB ENCOUNTER (OUTPATIENT)
Dept: LAB | Age: 73
End: 2023-10-27
Attending: FAMILY MEDICINE

## 2023-10-27 DIAGNOSIS — D64.9 MILD ANEMIA: ICD-10-CM

## 2023-10-27 LAB
BASOPHILS # BLD AUTO: 0.03 X10(3) UL (ref 0–0.2)
BASOPHILS NFR BLD AUTO: 0.6 %
DEPRECATED HBV CORE AB SER IA-ACNC: 28.5 NG/ML
EOSINOPHIL # BLD AUTO: 0.37 X10(3) UL (ref 0–0.7)
EOSINOPHIL NFR BLD AUTO: 6.8 %
ERYTHROCYTE [DISTWIDTH] IN BLOOD BY AUTOMATED COUNT: 16 %
HCT VFR BLD AUTO: 46.2 %
HGB BLD-MCNC: 15.2 G/DL
IMM GRANULOCYTES # BLD AUTO: 0.01 X10(3) UL (ref 0–1)
IMM GRANULOCYTES NFR BLD: 0.2 %
IRON SATN MFR SERPL: 21 %
IRON SERPL-MCNC: 70 UG/DL
LYMPHOCYTES # BLD AUTO: 1.12 X10(3) UL (ref 1–4)
LYMPHOCYTES NFR BLD AUTO: 20.6 %
MCH RBC QN AUTO: 30 PG (ref 26–34)
MCHC RBC AUTO-ENTMCNC: 32.9 G/DL (ref 31–37)
MCV RBC AUTO: 91.3 FL
MONOCYTES # BLD AUTO: 0.67 X10(3) UL (ref 0.1–1)
MONOCYTES NFR BLD AUTO: 12.3 %
NEUTROPHILS # BLD AUTO: 3.24 X10 (3) UL (ref 1.5–7.7)
NEUTROPHILS # BLD AUTO: 3.24 X10(3) UL (ref 1.5–7.7)
NEUTROPHILS NFR BLD AUTO: 59.5 %
PLATELET # BLD AUTO: 196 10(3)UL (ref 150–450)
RBC # BLD AUTO: 5.06 X10(6)UL
TIBC SERPL-MCNC: 340 UG/DL (ref 240–450)
TRANSFERRIN SERPL-MCNC: 228 MG/DL (ref 200–360)
WBC # BLD AUTO: 5.4 X10(3) UL (ref 4–11)

## 2023-10-27 PROCEDURE — 85025 COMPLETE CBC W/AUTO DIFF WBC: CPT

## 2023-10-27 PROCEDURE — 83550 IRON BINDING TEST: CPT

## 2023-10-27 PROCEDURE — 82728 ASSAY OF FERRITIN: CPT

## 2023-10-27 PROCEDURE — 83540 ASSAY OF IRON: CPT

## 2023-12-13 ENCOUNTER — PATIENT MESSAGE (OUTPATIENT)
Dept: SURGERY | Facility: CLINIC | Age: 73
End: 2023-12-13

## 2023-12-26 ENCOUNTER — TELEPHONE (OUTPATIENT)
Dept: SURGERY | Facility: CLINIC | Age: 73
End: 2023-12-26

## 2023-12-26 NOTE — TELEPHONE ENCOUNTER
RN faxed the Trimix order to [facility] today, 12/26     Trimix #13  Inject 5 units intracavernosally as instructed  Increase or decreased by 5 units until desired effect achieved.  Maximum dose 40 units   Refills: PRN  Syringe: 1cc: 31 gauge x 5/16\" insulin syringes  Qty: 20

## 2024-01-25 ENCOUNTER — OFFICE VISIT (OUTPATIENT)
Dept: FAMILY MEDICINE CLINIC | Facility: CLINIC | Age: 74
End: 2024-01-25
Payer: MEDICARE

## 2024-01-25 VITALS
SYSTOLIC BLOOD PRESSURE: 120 MMHG | HEART RATE: 53 BPM | RESPIRATION RATE: 16 BRPM | BODY MASS INDEX: 29.66 KG/M2 | DIASTOLIC BLOOD PRESSURE: 56 MMHG | TEMPERATURE: 97 F | HEIGHT: 68.5 IN | WEIGHT: 198 LBS

## 2024-01-25 DIAGNOSIS — N50.811 RIGHT TESTICULAR PAIN: Primary | ICD-10-CM

## 2024-01-25 PROCEDURE — 99214 OFFICE O/P EST MOD 30 MIN: CPT | Performed by: FAMILY MEDICINE

## 2024-01-25 RX ORDER — METHYLPREDNISOLONE 4 MG/1
TABLET ORAL
COMMUNITY
Start: 2023-12-18

## 2024-01-25 RX ORDER — CEFDINIR 300 MG/1
300 CAPSULE ORAL 2 TIMES DAILY
COMMUNITY
Start: 2024-01-25 | End: 2024-02-08

## 2024-01-26 NOTE — PROGRESS NOTES
[facility] Family Medicine Office Note  Chief Complaint:   Chief Complaint   Patient presents with    Other     Pt sts bulge on his right testicle. Notice it about 4-6 weeks ago.        HPI:   This is a 73 year old male coming in for right testicular discomfort.  The patient states that this has been going on for the last 4 to 6 weeks.  He felt that there was any bulge on the right testicle has not had any other symptoms at this time denies any constipation abdominal pain      Past Medical History:   Diagnosis Date    Adenomatous colon polyp 7/17, 10/12, 12/06    Prostate cancer (HCC) 2001     Past Surgical History:   Procedure Laterality Date    CABG  2001    CATARACT Right 10/2017    CATARACT Left 11/2017    COLONOSCOPY  7/17, 10/12, 12/06, 1/01    Dr. Orta - repeat in 5yrs     INNER EAR SURGERY PROC UNLISTED  90    replaced bones in right middle ear    OTHER SURGICAL HISTORY  01    prostate surgery    SINUS SURGERY    97    endoscopy w polypectomy    TONSILLECTOMY  56    VASECTOMY  96     Social History:  Social History     Socioeconomic History    Marital status:    Tobacco Use    Smoking status: Never    Smokeless tobacco: Never   Vaping Use    Vaping Use: Never used   Substance and Sexual Activity    Alcohol use: Yes     Alcohol/week: 1.7 standard drinks of alcohol     Types: 2 Standard drinks or equivalent per week     Comment: occ 1-2 glasses a week    Drug use: No   Other Topics Concern    Caffeine Concern No    Exercise Yes     Comment: 4x a week     Family History:  Family History   Problem Relation Age of Onset    Heart Attack Father     Hypertension Mother      Allergies:  Allergies   Allergen Reactions    Morphine NAUSEA ONLY     Derivatives      Sulfa Drugs Cross Reactors     Sulfa Antibiotics RASH     Only sulfa not antibiotics     Current Meds:  Current Outpatient Medications   Medication Sig Dispense Refill    cefdinir 300 MG Oral Cap Take 1 capsule (300 mg total) by mouth 2 (two)  times daily.      methylPREDNISolone 4 MG Oral Tablet Therapy Pack TAKE 6 TABLETS ON DAY 1 AS DIRECTED ON PACKAGE AND DECREASE BY 1 TAB EACH DAY FOR A TOTAL OF 6 DAYS      Multiple Vitamins-Iron (ONE DAILY MULTIVITAMIN/IRON) Oral Tab Take by mouth.  0    PREVIDENT 5000 BOOSTER PLUS 1.1 % Dental Paste USE 1-2 TIMES DAILY IN PLACE OF REGULAR TOOTHPASTE. SPIT OUT & DO NOT RINSE. NO FOOD/DRINK X30 MIN      Metoprolol Succinate ER 25 MG Oral Tablet 24 Hr Take 1 tablet (25 mg total) by mouth daily.      TURMERIC OR Take 1 capsule by mouth daily.      Atorvastatin Calcium 20 MG Oral Tab Take 1 tablet (20 mg total) by mouth daily. 90 tablet 1    omega-3 fatty acids (FISH OIL) 1000 MG Oral Cap Take 4,000 mg by mouth daily.      BABY ASPIRIN OR Take 1 tablet by mouth.      Cholecalciferol (VITAMIN D-3) 5000 UNITS Oral Tab Take 1 tablet (5,000 Units total) by mouth.      Multiple Vitamins-Minerals (MULTI-VITAMIN/MINERALS) Oral Tab Take 1 tablet by mouth daily.      MetroNIDAZOLE 1 % External Gel Use as directed (Patient not taking: Reported on 10/9/2023)  99      Counseling given: Not Answered       REVIEW OF SYSTEMS:   Consitutional: No fevers, chills, sweats  Eye: No recent visual problems  ENMT: No ear pain nasal congestion sore throat  Respiratory: No shortness of breath, cough  Cardiovascular: No chest pain palpitations syncope  Gastrointestinal: No nausea vomiting diarrhea  Genitourinary: No hematuria  Hema/Lymph no bruising tendency, swollen lymph glands  Endocrine: Negative for excessive thirst excessive hunger  Musculoskeletal: No back pain neck pain joint pain muscle pain decreased range of motion      Medical, surgical, family, and social histories were reviewed      EXAM:   VITALS:   Vitals:    01/25/24 1301   BP: 120/56   Pulse: 53   Resp: 16   Temp: 97 °F (36.1 °C)      GENERAL: well developed, well nourished, in no apparent distress  SKIN: no rashes, no suspicious lesions: Cool and Dry  HEENT: atraumatic,  normocephalic, ears and throat are clear    Ears: TM's clear and visible bilaterally, no excess cerumen or erythema.   EYES: Pupils equal round and reactive.  Extraocular motions intact no scleral icterus no injection or drainage  THROAT without erythema tonsillar hypertrophy or exudate.  Uvula midline airway patent  NECK: Given midline.  No JVD or lymphadenopathy supple nontender no meningeal signs   LUNGS: clear to auscultation sounds equal bilaterally no wheezes rales or rhonchi  CARDIO: Regular rate and rhythm without murmurs gallops or rubs  GI: Soft nontender nondistended no hepatomegaly palpable masses.  No guarding.   normal circumcised penis no discharge no erythema present there is a small mobile mass appreciated on the right testicle no pain to palpation left testicle no masses no lumps no pain to palpation no hernias appreciated    ASSESSMENT AND PLAN:   1. Right testicular pain  - US SCROTUM W/ DOPPLER (CPT=93975/86029); Future  Did discuss with the patient at this time this is likely a benign finding but we will proceed with having an ultrasound completed.  He was asked to follow-up once the ultrasound is completed for further recommendations.    Meds & Refills for this Visit:  Requested Prescriptions      No prescriptions requested or ordered in this encounter       Health Maintenance:  Health Maintenance Due   Topic Date Due    COVID-19 Vaccine (1) Never done    Colorectal Cancer Screening  07/10/2022    Influenza Vaccine (1) 10/01/2023    Annual Depression Screening  01/01/2024    Fall Risk Screening (Annual)  01/01/2024       Patient/Caregiver Education: Patient/Caregiver Education: There are no barriers to learning. Medical education done.   Outcome: Patient verbalizes understanding. Patient is notified to call with any questions, complications, allergies, or worsening or changing symptoms.  Patient is to call with any side effects or complications from the treatments as a result of today.      Problem List:  Patient Active Problem List   Diagnosis    Screening for genitourinary condition    Screening for iron deficiency anemia    Carbuncle and furuncle of unspecified site    Encounter for long-term (current) use of other medications    Coronary atherosclerosis    Pure hypercholesterolemia    Malignant neoplasm of prostate (HCC)    Annual physical exam    ED (erectile dysfunction)    Asymmetric SNHL (sensorineural hearing loss)    Prostate cancer (HCC)    CAD in native artery    Achilles tendinitis of both lower extremities    Closed fracture of distal phalanx or phalanges of hand    Contracture of both Achilles tendons    Finger pain, right    Mobitz type 1 second degree AV block    Posterior calcaneal exostosis    Arteriosclerosis of coronary artery    Calcaneal spur, right foot         No follow-ups on file.     Levi Chin MD    Please note that portions of this note may have been completed with a voice recognition program. Efforts were made to edit the dictations but occasionally words are mis-transcribed.

## 2024-01-30 ENCOUNTER — TELEPHONE (OUTPATIENT)
Dept: FAMILY MEDICINE CLINIC | Facility: CLINIC | Age: 74
End: 2024-01-30

## 2024-01-30 NOTE — TELEPHONE ENCOUNTER
Reminder to please addend your office visit notes from LOV on 01/25/2024 to change the active problem list diagnosis prostate cancer C61 to history of prostate cancer. Pt was notified you would make the addendum for him and was very appreciative. Thank you.

## 2024-01-30 NOTE — TELEPHONE ENCOUNTER
Pt called in and stated his displeasure with the fact that he has been denied renewal of his Medicare supplement because someone in our office used the code C-61 which indicates active cancer. Pt states that he had prostate cancer years ago and he no longer has it. Pt requesting that this gets corrected as now he has to provide 3 yrs of his medical history to the supplemental insurance to try and get his policy renewed. Pt was transferred to Gayla's voice mail.

## 2024-01-30 NOTE — TELEPHONE ENCOUNTER
I spoke to Dr. Chin and he will addend his note form pt.s LOV on 2024. To change dx: of prostate cancer to a History of prostate cancer.   I called and notified the pt.of above. Pt needs his medical records from 2021 to present from just our office faxed to [facility] [phone]   Attention : Underwriting. The cover sheet must have the pt.s name/  / and [facility] # [id number]. Pt is aware I will take care of this on 2024 and call him once the fax transmission goes through. I also sent a message to Gayla, Clinical supervisor that the issue will be taken care of on Thursday. Pt expressed his gratitude and verbalized understanding.

## 2024-01-30 NOTE — TELEPHONE ENCOUNTER
Pt called requesting three years of records be sent to [facility]. I advised PT that either [facility] or he would need to fill out a records release form. He expressed frustration at this saying Dr Moura is sending it just on his word. I stated due to HIPAA laws we require a signed consent. He also stated that there was an error in his medical record. On a visit witn Dr Mejia it was stated he has cancer that he does not have. I advise that should be corrected first and transferred to Gayla's voicemail.

## 2024-02-01 NOTE — TELEPHONE ENCOUNTER
I called the pt and notified him I faxed 144 pages w/ Cover to [facility]: attention underwriting. [phone]. Pt verbalized understanding.

## 2024-02-01 NOTE — TELEPHONE ENCOUNTER
Records for the past 3 years from our office copied and faxed to [facility] attention: underwriting. Pt.s name, , [employer] number on the cover. Awaiting confirmation of completed fax.   ELIAS Jenkins notified me the fax to [facility] did not  go through. We have attempted to refax the medical records on 3 different times with no successful transmission. I notified the pt of above. I did verify the fax number with the pt. I called Niki with the underwriting dept at [phone] she has no voicemail and the office is now closed. The office is open from 8:00 am - 4:00 pm CST. I gave the fax to Alice to call Niki tomorrow regarding the fax not going through. Katelin will update me with the information tomorrow. Pt aware I will call him as soon as we either get the fax to go through or talk to Niki. Pt verbalized understanding.

## 2024-02-02 NOTE — TELEPHONE ENCOUNTER
Called to [facility] dept, attempting to s/w Niki, was connected instead with Marine.    S/w Marine, advised of numerous attempts to send fax, x2 failed, x2 status unknown.    Marine suggests breaking up fax into 20-30 page sent at a time as that may be more successful.    Marine indicates will document that PCP office called to advise of attempts to fax/ acknowledges we are still working on it.      Fax sent in 30 page groupings (5 faxes total), awaiting conformation.

## 2024-02-05 NOTE — TELEPHONE ENCOUNTER
Pt notified we received fax confirmation that the fax regarding his medical records for the past 3 years was successful.

## 2024-03-14 ENCOUNTER — HOSPITAL ENCOUNTER (OUTPATIENT)
Dept: ULTRASOUND IMAGING | Facility: HOSPITAL | Age: 74
Discharge: HOME | End: 2024-03-14
Attending: FAMILY MEDICINE
Payer: MEDICARE

## 2024-03-14 DIAGNOSIS — N50.811 RIGHT TESTICULAR PAIN: ICD-10-CM

## 2024-03-14 PROCEDURE — 93975 VASCULAR STUDY: CPT | Performed by: FAMILY MEDICINE

## 2024-03-14 PROCEDURE — 76870 US EXAM SCROTUM: CPT | Performed by: FAMILY MEDICINE

## 2024-05-20 ENCOUNTER — LAB ENCOUNTER (OUTPATIENT)
Dept: LAB | Facility: HOSPITAL | Age: 74
End: 2024-05-20
Attending: INTERNAL MEDICINE

## 2024-05-20 DIAGNOSIS — Z95.1 HX OF CABG: ICD-10-CM

## 2024-05-20 DIAGNOSIS — E78.00 PURE HYPERCHOLESTEROLEMIA: ICD-10-CM

## 2024-05-20 DIAGNOSIS — I25.10 CORONARY ATHEROSCLEROSIS OF NATIVE CORONARY ARTERY: Primary | ICD-10-CM

## 2024-05-20 DIAGNOSIS — I44.1 MOBITZ TYPE 1 SECOND DEGREE ATRIOVENTRICULAR BLOCK: ICD-10-CM

## 2024-05-20 LAB
ALBUMIN SERPL-MCNC: 3.5 G/DL (ref 3.4–5)
ALBUMIN/GLOB SERPL: 1 {RATIO} (ref 1–2)
ALP LIVER SERPL-CCNC: 76 U/L
ALT SERPL-CCNC: 30 U/L
ANION GAP SERPL CALC-SCNC: 2 MMOL/L (ref 0–18)
AST SERPL-CCNC: 27 U/L (ref 15–37)
BASOPHILS # BLD AUTO: 0.04 X10(3) UL (ref 0–0.2)
BASOPHILS NFR BLD AUTO: 1 %
BILIRUB SERPL-MCNC: 0.6 MG/DL (ref 0.1–2)
BUN BLD-MCNC: 27 MG/DL (ref 9–23)
CALCIUM BLD-MCNC: 9.6 MG/DL (ref 8.5–10.1)
CHLORIDE SERPL-SCNC: 108 MMOL/L (ref 98–112)
CHOLEST SERPL-MCNC: 129 MG/DL (ref ?–200)
CO2 SERPL-SCNC: 30 MMOL/L (ref 21–32)
CREAT BLD-MCNC: 1.12 MG/DL
EGFRCR SERPLBLD CKD-EPI 2021: 69 ML/MIN/1.73M2 (ref 60–?)
EOSINOPHIL # BLD AUTO: 0.47 X10(3) UL (ref 0–0.7)
EOSINOPHIL NFR BLD AUTO: 11.9 %
ERYTHROCYTE [DISTWIDTH] IN BLOOD BY AUTOMATED COUNT: 13.4 %
FASTING PATIENT LIPID ANSWER: YES
FASTING STATUS PATIENT QL REPORTED: YES
GLOBULIN PLAS-MCNC: 3.4 G/DL (ref 2.8–4.4)
GLUCOSE BLD-MCNC: 95 MG/DL (ref 70–99)
HCT VFR BLD AUTO: 46.8 %
HDLC SERPL-MCNC: 42 MG/DL (ref 40–59)
HGB BLD-MCNC: 15.8 G/DL
IMM GRANULOCYTES # BLD AUTO: 0 X10(3) UL (ref 0–1)
IMM GRANULOCYTES NFR BLD: 0 %
LDLC SERPL CALC-MCNC: 76 MG/DL (ref ?–100)
LYMPHOCYTES # BLD AUTO: 0.8 X10(3) UL (ref 1–4)
LYMPHOCYTES NFR BLD AUTO: 20.3 %
MCH RBC QN AUTO: 31.4 PG (ref 26–34)
MCHC RBC AUTO-ENTMCNC: 33.8 G/DL (ref 31–37)
MCV RBC AUTO: 93 FL
MONOCYTES # BLD AUTO: 0.56 X10(3) UL (ref 0.1–1)
MONOCYTES NFR BLD AUTO: 14.2 %
NEUTROPHILS # BLD AUTO: 2.07 X10 (3) UL (ref 1.5–7.7)
NEUTROPHILS # BLD AUTO: 2.07 X10(3) UL (ref 1.5–7.7)
NEUTROPHILS NFR BLD AUTO: 52.6 %
NONHDLC SERPL-MCNC: 87 MG/DL (ref ?–130)
OSMOLALITY SERPL CALC.SUM OF ELEC: 295 MOSM/KG (ref 275–295)
PLATELET # BLD AUTO: 177 10(3)UL (ref 150–450)
POTASSIUM SERPL-SCNC: 4.2 MMOL/L (ref 3.5–5.1)
PROT SERPL-MCNC: 6.9 G/DL (ref 6.4–8.2)
RBC # BLD AUTO: 5.03 X10(6)UL
SODIUM SERPL-SCNC: 140 MMOL/L (ref 136–145)
TRIGL SERPL-MCNC: 50 MG/DL (ref 30–149)
VLDLC SERPL CALC-MCNC: 8 MG/DL (ref 0–30)
WBC # BLD AUTO: 3.9 X10(3) UL (ref 4–11)

## 2024-05-20 PROCEDURE — 80053 COMPREHEN METABOLIC PANEL: CPT

## 2024-05-20 PROCEDURE — 36415 COLL VENOUS BLD VENIPUNCTURE: CPT

## 2024-05-20 PROCEDURE — 80061 LIPID PANEL: CPT

## 2024-05-20 PROCEDURE — 85025 COMPLETE CBC W/AUTO DIFF WBC: CPT

## 2024-06-04 ENCOUNTER — TELEPHONE (OUTPATIENT)
Dept: SURGERY | Facility: CLINIC | Age: 74
End: 2024-06-04

## 2024-06-04 NOTE — TELEPHONE ENCOUNTER
Per patient states he was referred back to urology by PCP due to recent testicle ultrasound results, wanted to see Dr. Turk prior to next available on 7/23, no openings sooner, patient requesting to speak to RN to determine if he should be seen soon. Please call thank you.

## 2024-06-06 ENCOUNTER — TELEPHONE (OUTPATIENT)
Dept: SURGERY | Facility: CLINIC | Age: 74
End: 2024-06-06

## 2024-06-06 ENCOUNTER — OFFICE VISIT (OUTPATIENT)
Dept: SURGERY | Facility: CLINIC | Age: 74
End: 2024-06-06
Payer: MEDICARE

## 2024-06-06 DIAGNOSIS — N52.9 ERECTILE DYSFUNCTION, UNSPECIFIED ERECTILE DYSFUNCTION TYPE: ICD-10-CM

## 2024-06-06 DIAGNOSIS — N45.2 ORCHITIS: Primary | ICD-10-CM

## 2024-06-06 LAB
APPEARANCE: CLEAR
BILIRUBIN: NEGATIVE
GLUCOSE (URINE DIPSTICK): NEGATIVE MG/DL
KETONES (URINE DIPSTICK): NEGATIVE MG/DL
LEUKOCYTES: NEGATIVE
MULTISTIX LOT#: NORMAL NUMERIC
NITRITE, URINE: NEGATIVE
OCCULT BLOOD: NEGATIVE
PH, URINE: 7 (ref 4.5–8)
PROTEIN (URINE DIPSTICK): NEGATIVE MG/DL
SPECIFIC GRAVITY: 1.02 (ref 1–1.03)
URINE-COLOR: YELLOW
UROBILINOGEN,SEMI-QN: 0.2 MG/DL (ref 0–1.9)

## 2024-06-06 PROCEDURE — 99213 OFFICE O/P EST LOW 20 MIN: CPT

## 2024-06-06 PROCEDURE — 81003 URINALYSIS AUTO W/O SCOPE: CPT

## 2024-06-06 NOTE — TELEPHONE ENCOUNTER
This encounter is now closed.     RN faxed the Trimix order to [facility] today, 12/26      Trimix #13  Inject 5 units intracavernosally as instructed  Increase or decreased by 5 units until desired effect achieved. Maximum dose 40 units   Refills: PRN  Syringe: 1cc: 31 gauge x 5/16\" insulin syringes  Qty: 20  May use: 3-4 times weekly

## 2024-06-06 NOTE — PROGRESS NOTES
HPI:   Tony Crum is a(n) 73 year old male patient with a hx of prostatectomy 20yrs ago, 2x vasectomy and vasectomy reversal who presents for follow up.    I last saw him 6/2023 for ED. At the time we began trimix and this has been working very well for him. He is doing trimix #13 at 10cc.     He is c/o single occurrence of fluid build up near penis glans away from where he injects trimix. This has since resolved.     Additionally, noticed a RIGHT sided testicular lump and had PCP order scrotal US 3/14/23 which showed hyperemia of LEFT testicle could be orchitis. He does not have any complaints regarding left sided pain but does note occasional dull acheyness that lasts a couple days that seems chronic. No right sided pain with the lump. Admits to some bilateral groin strain after intercourse that eventually spontaenously resolves.     No urinary complaints.     HISTORY:  Past Medical History:    Adenomatous colon polyp    Malignant neoplasm of prostate (HCC)    Prostate cancer (HCC)    Prostate cancer (HCC)      Past Surgical History:   Procedure Laterality Date    Cabg  2001    Cataract Right 10/2017    Cataract Left 11/2017    Colonoscopy  7/17, 10/12, 12/06, 1/01    Dr. Orta - repeat in 5yrs     Inner ear surgery proc unlisted  90    replaced bones in right middle ear    Other surgical history  01    prostate surgery    Sinus surgery    97    endoscopy w polypectomy    Tonsillectomy  56    Vasectomy  96      Family History   Problem Relation Age of Onset    Heart Attack Father     Hypertension Mother       Social History:   Social History     Socioeconomic History    Marital status:    Tobacco Use    Smoking status: Never    Smokeless tobacco: Never   Vaping Use    Vaping status: Never Used   Substance and Sexual Activity    Alcohol use: Yes     Alcohol/week: 1.7 standard drinks of alcohol     Types: 2 Standard drinks or equivalent per week     Comment: occ 1-2 glasses a week    Drug use: No   Other  Topics Concern    Caffeine Concern No    Exercise Yes     Comment: 4x a week        Medications (Active prior to today's visit):  Current Outpatient Medications   Medication Sig Dispense Refill    methylPREDNISolone 4 MG Oral Tablet Therapy Pack TAKE 6 TABLETS ON DAY 1 AS DIRECTED ON PACKAGE AND DECREASE BY 1 TAB EACH DAY FOR A TOTAL OF 6 DAYS      Multiple Vitamins-Iron (ONE DAILY MULTIVITAMIN/IRON) Oral Tab Take by mouth.  0    PREVIDENT 5000 BOOSTER PLUS 1.1 % Dental Paste USE 1-2 TIMES DAILY IN PLACE OF REGULAR TOOTHPASTE. SPIT OUT & DO NOT RINSE. NO FOOD/DRINK X30 MIN      Metoprolol Succinate ER 25 MG Oral Tablet 24 Hr Take 1 tablet (25 mg total) by mouth daily.      TURMERIC OR Take 1 capsule by mouth daily.      Atorvastatin Calcium 20 MG Oral Tab Take 1 tablet (20 mg total) by mouth daily. 90 tablet 1    MetroNIDAZOLE 1 % External Gel Use as directed (Patient not taking: Reported on 10/9/2023)  99    omega-3 fatty acids (FISH OIL) 1000 MG Oral Cap Take 4,000 mg by mouth daily.      BABY ASPIRIN OR Take 1 tablet by mouth.      Cholecalciferol (VITAMIN D-3) 5000 UNITS Oral Tab Take 1 tablet (5,000 Units total) by mouth.      Multiple Vitamins-Minerals (MULTI-VITAMIN/MINERALS) Oral Tab Take 1 tablet by mouth daily.         Allergies:  Allergies   Allergen Reactions    Morphine NAUSEA ONLY     Derivatives      Sulfa Drugs Cross Reactors     Sulfa Antibiotics RASH     Only sulfa not antibiotics       ROS:     A comprehensive 10 point review of systems was completed.  Pertinent positives and negatives noted in the the HPI.    PHYSICAL EXAM:     GENERAL APPEARANCE: well, developed, well nourished, in no acute distress  EARS: hearing intact  LUNGS: nonlabored breathing  ABDOMEN: soft, no obvious masses or tenderness, no CVA tenderness  INGUINAL CANALS: no hernias  PENILE MEATUS: open and in normal location  PENIS: normal, no palpable plaques concerning for peyronies  SCROTUM: normal, small spermatocele like  structure palpated in right testicle; possible scar tissue from double vasectomy and reversal  TESTES: normal anatomy  EPIDIDYMIS: normal anatomy, left side may be slightly swollen but not TTP       ASSESSMENT/PLAN:   Orchitis  - repeat US to see if hyperemia resolved  - may consider 2wk course of levaquin if not    ED  - continue trimix  - inject away from vessels if possible    The above assessment and plan were discussed in detail with the patient who verbalized understanding and all questions were answered.    Joelle Varela PA-C  Urology  [facility]  Office: [phone]

## 2024-06-19 ENCOUNTER — HOSPITAL ENCOUNTER (OUTPATIENT)
Dept: ULTRASOUND IMAGING | Age: 74
Discharge: HOME | End: 2024-06-19

## 2024-06-19 DIAGNOSIS — N45.2 ORCHITIS: ICD-10-CM

## 2024-06-19 PROCEDURE — 93975 VASCULAR STUDY: CPT

## 2024-06-19 PROCEDURE — 76870 US EXAM SCROTUM: CPT

## 2024-06-21 ENCOUNTER — TELEPHONE (OUTPATIENT)
Dept: SURGERY | Facility: CLINIC | Age: 74
End: 2024-06-21

## 2024-06-28 ENCOUNTER — OFFICE VISIT (OUTPATIENT)
Dept: FAMILY MEDICINE CLINIC | Facility: CLINIC | Age: 74
End: 2024-06-28

## 2024-06-28 VITALS
DIASTOLIC BLOOD PRESSURE: 56 MMHG | SYSTOLIC BLOOD PRESSURE: 102 MMHG | TEMPERATURE: 98 F | BODY MASS INDEX: 29.81 KG/M2 | WEIGHT: 199 LBS | HEART RATE: 55 BPM | RESPIRATION RATE: 18 BRPM | HEIGHT: 68.5 IN

## 2024-06-28 DIAGNOSIS — E78.00 PURE HYPERCHOLESTEROLEMIA: ICD-10-CM

## 2024-06-28 DIAGNOSIS — I44.1 MOBITZ TYPE 1 SECOND DEGREE AV BLOCK: ICD-10-CM

## 2024-06-28 DIAGNOSIS — Z00.00 MEDICARE ANNUAL WELLNESS VISIT, SUBSEQUENT: ICD-10-CM

## 2024-06-28 DIAGNOSIS — C61 PROSTATE CANCER (HCC): ICD-10-CM

## 2024-06-28 PROBLEM — M25.561: Status: ACTIVE | Noted: 2023-08-01

## 2024-06-28 PROCEDURE — G0439 PPPS, SUBSEQ VISIT: HCPCS | Performed by: FAMILY MEDICINE

## 2024-06-28 NOTE — PROGRESS NOTES
Subjective:   Tony Crum is a 73 year old male who presents for a Medicare Subsequent Annual Wellness visit (Pt already had Initial Annual Wellness) and scheduled follow up of multiple significant but stable problems.   Patient has a past medical history of coronary artery disease prostate cancer hypercholesterolemia Mobitz type I.  Patient has been following up with urology due to recent testicular enlargement of the left testicle.  Patient states that they are looking into possible cancer diagnosis.    History/Other:   Fall Risk Assessment:   He has been screened for Falls and is low risk.      Cognitive Assessment:   He had a completely normal cognitive assessment - see flowsheet entries     Functional Ability/Status:   Tony Crum has some abnormal functions as listed below:  He has Hearing problems based on screening of functional status.He has Vision problems based on screening of functional status.       Depression Screening (PHQ-2/PHQ-9): PHQ-2 SCORE: 0  , done 6/28/2024       Advanced Directives:   He does have a Living Will but we do NOT have it on file in Epic.    He does have a POA but we do NOT have it on file in Epic.    Discussed Advance Care Planning with patient (and family/surrogate if present). Standard forms made available to patient in After Visit Summary.      Patient Active Problem List   Diagnosis    Screening for genitourinary condition    Screening for iron deficiency anemia    Carbuncle and furuncle of unspecified site    Encounter for long-term (current) use of other medications    Coronary atherosclerosis    Pure hypercholesterolemia    Annual physical exam    ED (erectile dysfunction)    Asymmetric SNHL (sensorineural hearing loss)    CAD in native artery    Achilles tendinitis of both lower extremities    Closed fracture of distal phalanx or phalanges of hand    Contracture of both Achilles tendons    Finger pain, right    Mobitz type 1 second degree AV block    Posterior calcaneal  exostosis    Arteriosclerosis of coronary artery    Calcaneal spur, right foot    Tenderness of right knee    Prostate cancer (HCC)     Allergies:  He is allergic to morphine, sulfa drugs cross reactors, and sulfa antibiotics.    Current Medications:  No outpatient medications have been marked as taking for the 6/28/24 encounter (Office Visit) with Levi Chin MD.       Medical History:  He  has a past medical history of Adenomatous colon polyp (7/17, 10/12, 12/06), Malignant neoplasm of prostate (HCC) (06/29/2012), Prostate cancer (HCC) (2001), and Prostate cancer (HCC) (04/10/2002).  Surgical History:  He  has a past surgical history that includes cabg (2001); inner ear surgery proc unlisted (90); sinus surgery   (97); other surgical history (01); vasectomy (96); tonsillectomy (56); colonoscopy (7/17, 10/12, 12/06, 1/01); cataract (Right, 10/2017); and cataract (Left, 11/2017).   Family History:  His family history includes Heart Attack in his father; Hypertension in his mother.  Social History:  He  reports that he has never smoked. He has never used smokeless tobacco. He reports current alcohol use of about 1.7 standard drinks of alcohol per week. He reports that he does not use drugs.    Tobacco:  He has never smoked tobacco.    CAGE Alcohol Screen:   CAGE screening score of 0 on 6/27/2024, showing low risk of alcohol abuse.      Patient Care Team:  Levi Chin MD as PCP - General  Jay Orta MD (GASTROENTEROLOGY)    Review of Systems  Consitutional: No fevers, chills, sweats  Eye: No recent visual problems  ENMT: No ear pain nasal congestion sore throat  Respiratory: No shortness of breath, cough  Cardiovascular: No chest pain palpitations syncope  Gastrointestinal: No nausea vomiting diarrhea  Genitourinary: No hematuria  Hema/Lymph no bruising tendency, swollen lymph glands  Endocrine: Negative for excessive thirst excessive hunger  Musculoskeletal: No back pain neck pain joint pain muscle  pain decreased range of motion  Integumentary: No rash, pruritus, abrasions  Neurologic: Alert and oriented x4  Psychiatric: No anxiety, depression    Medical, surgical, family, and social histories were reviewed      Objective:   Physical Exam    VITALS: Vitals reviewed   GENERAL: well developed, well nourished, in no apparent distress  SKIN: no rashes, no suspicious lesions: Cool and Dry  HEENT: atraumatic, normocephalic, ears and throat are clear bilateral tympanic membranes lucent nonbulging intact nose without bleeding purulent discharge or septal hematoma.    EYES: Pupils equal round and reactive.  Extraocular motions intact no scleral icterus no injection or drainage  THROAT without erythema tonsillar hypertrophy or exudate.  Uvula midline airway patent                Hearing Assessed via: Whispered Voice  NECK: trachea midline.  No JVD or lymphadenopathy supple nontender no meningeal signs   LUNGS: clear to auscultation sounds equal bilaterally no wheezes rales or rhonchi  CARDIO: Regular rate and rhythm without murmurs gallops or rubs  GI: Soft nontender nondistended no hepatomegaly palpable masses.  No guarding.   without deformity or crepitus no flank tenderness  EXTREMITIES: no cyanosis, clubbing or edema no joint tenderness effusion or edema noted.  No calf tenderness negative Homans' sign bilaterally  NEURO: Awake and alert.  Cranial nerves II through XII intact motor and sensory grossly within normal limits.  5 out of 5 muscle strength in all muscle groups.  Normal speech.  PSYCHIATRIC: Awake, alert and oriented x3, cooperative appropriate mood and affect.  Judgment intact          /56 (BP Location: Left arm, Patient Position: Sitting, Cuff Size: adult)   Pulse 55   Temp 97.5 °F (36.4 °C) (Tympanic)   Resp 18   Ht 5' 8.5\" (1.74 m)   Wt 199 lb (90.3 kg)   BMI 29.82 kg/m²  Estimated body mass index is 29.82 kg/m² as calculated from the following:    Height as of this encounter: 5'  8.5\" (1.74 m).    Weight as of this encounter: 199 lb (90.3 kg).    Medicare Hearing Assessment:   Hearing Screening    Time taken: 6/28/2024 10:31 AM  Entry User: Brady Avery MA  Screening Method: Finger Rub  Finger Rub Result: Pass (Comment: right ear hearing loss)         Visual Acuity:   Right Eye Visual Acuity: Corrected Right Eye Chart Acuity: 20/30   Left Eye Visual Acuity: Corrected Left Eye Chart Acuity: 20/25   Both Eyes Visual Acuity: Corrected Both Eyes Chart Acuity: 20/25   Able To Tolerate Visual Acuity: Yes        Assessment & Plan:   Tony Crum is a 73 year old male who presents for a Medicare Assessment.     1. Medicare annual wellness visit, subsequent    I did discuss with the patient at this time would suggest for him to follow-up with gastroenterology he does have a history of colon polyps.  He states that he has been complaining this at [place].  I did also discuss with the patient that we will have to wait and see in regards to what urology will be doing next with the enlargement of the left testicle he has not had any pain associated with this.  Has not had any urinary symptoms.  I did discuss that I would suggest updating a PSA.  He has history of prostate cancer for which she did have a prostatectomy completed.        2. Prostate cancer (HCC)  -     PSA Total, Diagnostic; Future; Expected date: 06/28/2024  3. Pure hypercholesterolemia    Patient's LDL cholesterol has been well-controlled he does have a history of bypass he is currently on atorvastatin he has been following up with cardiology was asked to continue to do so.      4. Mobitz type 1 second degree AV block  Patient is currently on metoprolol he has not had any increased heart palpitations dizziness headaches.  He is following up with cardiology        The patient presented today for Medicare annual wellness visit.  During the course of today's visit the health risk assessment past medical family and social histories were  updated and reviewed with the patient.  The patient was educated and counseled about appropriate screening and preventative services and these were ordered as appropriate.  Referrals for educational and counseling services were made where indicated.  Advance directives were discussed.  A copy of the recommended preventative screenings as well as discharge instructions were provided to the patient.  End-of-life planning was discussed advanced directives were also discussed and are in place.    The patient indicates understanding of these issues and agrees to the plan.  Lab work ordered.  Patient reassured.  Reinforced healthy diet, lifestyle, and exercise.      No follow-ups on file.     Levi Chin MD, 6/28/2024     Supplementary Documentation:   General Health:  In the past six months, have you lost more than 10 pounds without trying?: 2 - No  Has your appetite been poor?: No  Type of Diet: Balanced  How does the patient maintain a good energy level?: Appropriate Exercise  How would you describe your daily physical activity?: Moderate  How would you describe your current health state?: Good  How do you maintain positive mental well-being?: Social Interaction;Puzzles;Games;Visiting Friends;Visiting Family  On a scale of 0 to 10, with 0 being no pain and 10 being severe pain, what is your pain level?: 1 - (Mild)  In the past six months, have you experienced urine leakage?: 1-Yes  At any time do you feel concerned for the safety/well-being of yourself and/or your children, in your home or elsewhere?: No  Have you had any immunizations at another office such as Influenza, Hepatitis B, Tetanus, or Pneumococcal?: No        Tony Crum's SCREENING SCHEDULE   Tests on this list are recommended by your physician but may not be covered, or covered at this frequency, by your insurer.   Please check with your insurance carrier before scheduling to verify coverage.   PREVENTATIVE SERVICES FREQUENCY &  COVERAGE DETAILS  LAST COMPLETION DATE   Diabetes Screening    Fasting Blood Sugar / Glucose    One screening every 12 months if never tested or if previously tested but not diagnosed with pre-diabetes   One screening every 6 months if diagnosed with pre-diabetes Lab Results   Component Value Date    GLU 95 05/20/2024        Cardiovascular Disease Screening    Lipid Panel  Cholesterol  Lipoprotein (HDL)  Triglycerides Covered every 5 years for all Medicare beneficiaries without apparent signs or symptoms of cardiovascular disease Lab Results   Component Value Date    CHOLEST 129 05/20/2024    HDL 42 05/20/2024    LDL 76 05/20/2024    LDLD 75 03/12/2021    TRIG 50 05/20/2024         Electrocardiogram (EKG)   Covered if needed at Welcome to Medicare, and non-screening if indicated for medical reasons 04/29/2021      Ultrasound Screening for Abdominal Aortic Aneurysm (AAA) Covered once in a lifetime for one of the following risk factors    Men who are 65-75 years old and have ever smoked    Anyone with a family history -     Colorectal Cancer Screening  Covered for ages 50-85; only need ONE of the following:    Colonoscopy   Covered every 10 years    Covered every 2 years if patient is at high risk or previous colonoscopy was abnormal 07/10/2017    Health Maintenance   Topic Date Due    Colorectal Cancer Screening  07/10/2022       Flexible Sigmoidoscopy   Covered every 4 years -    Fecal Occult Blood Test Covered annually -   Prostate Cancer Screening    Prostate-Specific Antigen (PSA) Annually Lab Results   Component Value Date    PSA <0.01 04/25/2023     Health Maintenance   Topic Date Due    PSA  04/25/2025      Immunizations    Influenza Covered once per flu season  Please get every year -  No recommendations at this time    Pneumococcal Each vaccine ([id number] & [id number]) covered once after 65 Prevnar 13: -    [id number]: 08/21/2020     No recommendations at this time    Hepatitis B One screening covered for patients with  certain risk factors   -  No recommendations at this time    Tetanus Toxoid Not covered by Medicare Part B unless medically necessary (cut with metal); may be covered with your pharmacy prescription benefits 03/01/2013    Tetanus, Diptheria and Pertusis TD and TDaP Not covered by Medicare Part B -  No recommendations at this time    Zoster Not covered by Medicare Part B; may be covered with your pharmacy  prescription benefits -  No recommendations at this time

## 2024-06-30 ENCOUNTER — PATIENT MESSAGE (OUTPATIENT)
Dept: FAMILY MEDICINE CLINIC | Facility: CLINIC | Age: 74
End: 2024-06-30

## 2024-07-02 ENCOUNTER — OFFICE VISIT (OUTPATIENT)
Dept: SURGERY | Facility: CLINIC | Age: 74
End: 2024-07-02
Payer: MEDICARE

## 2024-07-02 DIAGNOSIS — N50.89 TESTIS MASS: Primary | ICD-10-CM

## 2024-07-02 PROCEDURE — 99215 OFFICE O/P EST HI 40 MIN: CPT | Performed by: SURGERY

## 2024-07-02 RX ORDER — CIPROFLOXACIN 500 MG/1
500 TABLET, FILM COATED ORAL 2 TIMES DAILY
Qty: 56 TABLET | Refills: 0 | Status: SHIPPED | OUTPATIENT
Start: 2024-07-02 | End: 2024-07-30

## 2024-07-02 NOTE — TELEPHONE ENCOUNTER
From: Tony Crum  To: Levi Chin  Sent: 6/30/2024 4:22 PM CDT  Subject: Colonoscopy    Please reach out to Dr. Jama at [facility] for more information    Colonoscopy with [facility]bryanna Jama MD  [facility]  [phone]    Case Report  Value  Surgical Pathology Report Case: [id number]  Authorizing Provider: Deon Jama MD Collected: 08/03/2022 08:46 AM  Ordering Location: [facility] Received: 08/03/2022 08:46 AM  Pathologist: Nagi Gardner MD  Specimen: Colon transverse, transverse colon polyps    Final Diagnosis  View trends  Transverse colon polyp:  -Tubular adenoma.  -Negative for malignancy.    Your next colonoscopy is due in 7-10 Years(s) for a personal history of colon polyps .

## 2024-07-02 NOTE — PROGRESS NOTES
Urology Clinic Note    Primary Care Provider:  Levi Chin MD     Chief Complaint:   Testicular lesion    HPI:   Tony Crum is a 73 year old male with history of prostate cancer status post prostatectomy, advised ED on Trimix injections, can be vasectomy x 2 and vasectomy reversal who has been seen by NOEL Macdonald for testicular findings on ultrasound.  The right testicle had a palpable bump.  Scrotal ultrasound actually showed a left lesion in the wall of the kidney and hyperemia that could be orchitis versus mass.    Repeat renal ultrasound 6/19/2024 shows heterogeneous echotexture with hyperemia of the left testicle.    He has intermittent mild left scrotal pain in certain positions.  On exam there are no testicular masses but he does have a slightly softer texture left testicle.    PSA:  Lab Results   Component Value Date    PSA <0.01 04/25/2023    PSA <0.01 03/05/2020    PSA <0.01 08/31/2019    PSA <0.01 (L) 05/24/2018    PSA <0.010 (L) 12/14/2015    PSAS 0.10 03/12/2021    PSAS <0.01 (L) 02/04/2017    QPSA <0.1 11/01/2014    QPSA <0.1 03/22/2014        History:     Past Medical History:    Adenomatous colon polyp    Malignant neoplasm of prostate (HCC)    Prostate cancer (HCC)    Prostate cancer (HCC)       Past Surgical History:   Procedure Laterality Date    Cabg  2001    Cataract Right 10/2017    Cataract Left 11/2017    Colonoscopy  7/17, 10/12, 12/06, 1/01    Dr. Orta - repeat in 5yrs     Inner ear surgery proc unlisted  90    replaced bones in right middle ear    Other surgical history  01    prostate surgery    Sinus surgery    97    endoscopy w polypectomy    Tonsillectomy  56    Vasectomy  96       Family History   Problem Relation Age of Onset    Heart Attack Father     Hypertension Mother        Social History     Socioeconomic History    Marital status:    Tobacco Use    Smoking status: Never    Smokeless tobacco: Never   Vaping Use    Vaping status: Never Used   Substance and  Sexual Activity    Alcohol use: Yes     Alcohol/week: 1.7 standard drinks of alcohol     Types: 2 Standard drinks or equivalent per week     Comment: occ 1-2 glasses a week    Drug use: No   Other Topics Concern    Caffeine Concern No    Exercise Yes     Comment: 4x a week       Medications (Active prior to today's visit):  Current Outpatient Medications   Medication Sig Dispense Refill    Multiple Vitamins-Iron (ONE DAILY MULTIVITAMIN/IRON) Oral Tab Take by mouth.  0    PREVIDENT 5000 BOOSTER PLUS 1.1 % Dental Paste USE 1-2 TIMES DAILY IN PLACE OF REGULAR TOOTHPASTE. SPIT OUT & DO NOT RINSE. NO FOOD/DRINK X30 MIN      Metoprolol Succinate ER 25 MG Oral Tablet 24 Hr Take 1 tablet (25 mg total) by mouth daily.      TURMERIC OR Take 1 capsule by mouth daily.      Atorvastatin Calcium 20 MG Oral Tab Take 1 tablet (20 mg total) by mouth daily. 90 tablet 1    MetroNIDAZOLE 1 % External Gel Use as directed (Patient not taking: Reported on 10/9/2023)  99    omega-3 fatty acids (FISH OIL) 1000 MG Oral Cap Take 4,000 mg by mouth daily.      BABY ASPIRIN OR Take 1 tablet by mouth.      Cholecalciferol (VITAMIN D-3) 5000 UNITS Oral Tab Take 1 tablet (5,000 Units total) by mouth.      Multiple Vitamins-Minerals (MULTI-VITAMIN/MINERALS) Oral Tab Take 1 tablet by mouth daily.         Allergies:  Allergies   Allergen Reactions    Morphine NAUSEA ONLY     Derivatives      Sulfa Drugs Cross Reactors     Sulfa Antibiotics RASH     Only sulfa not antibiotics       Review of Systems:   A comprehensive 10-point review of systems was completed.  Pertinent positives and negatives are noted in the the HPI.    Physical Exam:   CONSTITUTIONAL: Well developed, well nourished, in no acute distress  NEUROLOGIC: Alert and oriented  HEAD: Normocephalic, atraumatic  EYES: Sclera non-icteric  ENT: Hearing intact, moist mucous membranes  NECK: No obvious goiter or masses  RESPIRATORY: Normal respiratory effort  SKIN: No evident rashes  ABDOMEN:  Soft, non-tender, non-distended  GENITOURINARY: Normal phallus, orthotopic meatus, normal bilateral testicles with softer texture of left testicle but no masses  Thanks    Assessment & Plan:   Tony Crum is a 73 year old male with history of prostate cancer status post prostatectomy, advised ED on Trimix injections, can be vasectomy x 2 and vasectomy reversal who has been seen by NOEL Macdonald for testicular findings on ultrasound.  The right testicle had a palpable bump.  Scrotal ultrasound actually showed a left lesion in the wall of the kidney and hyperemia that could be orchitis versus mass.    Repeat renal ultrasound 6/19/2024 shows heterogeneous echotexture with hyperemia of the left testicle.    He has intermittent mild left scrotal pain in certain positions.  On exam there are no testicular masses but he does have a slightly softer texture left testicle.    I discussed that he does have an abnormal appearance of the left testicle on ultrasound.  This could be related to chronic infection, atrophy after multiple vasectomy and vasectomy reversal procedures, or lower likelihood malignancy.    -Antibiotics x 4 weeks  -Repeat ultrasound in 4 weeks  -If no change will rediscuss continued observation versus orchiectomy    In total, 40 minutes were spent on this patient encounter (including chart review, patient history, physical, and counseling, documentation, and communication).    J Carlos Huynh MD  Staff Urologist  [facility]  Office: [phone]

## 2024-07-30 ENCOUNTER — HOSPITAL ENCOUNTER (OUTPATIENT)
Dept: ULTRASOUND IMAGING | Age: 74
Discharge: HOME | End: 2024-07-30
Attending: SURGERY
Payer: MEDICARE

## 2024-07-30 DIAGNOSIS — N50.89 TESTIS MASS: ICD-10-CM

## 2024-07-30 PROCEDURE — 76870 US EXAM SCROTUM: CPT | Performed by: SURGERY

## 2024-07-30 PROCEDURE — 93975 VASCULAR STUDY: CPT | Performed by: SURGERY

## 2024-08-01 ENCOUNTER — OFFICE VISIT (OUTPATIENT)
Dept: SURGERY | Facility: CLINIC | Age: 74
End: 2024-08-01
Payer: MEDICARE

## 2024-08-01 DIAGNOSIS — N45.2 ORCHITIS: Primary | ICD-10-CM

## 2024-08-01 PROCEDURE — 99214 OFFICE O/P EST MOD 30 MIN: CPT | Performed by: SURGERY

## 2024-08-01 NOTE — PROGRESS NOTES
Urology Clinic Note    Primary Care Provider:  Levi Chin MD     Chief Complaint:   Testicular lesion     HPI:   Tony Crum is a 73 year old male with history of prostate cancer status post prostatectomy, advised ED on Trimix injections, vasectomy x 2 and vasectomy reversal who has been seen by NOEL Macdonald for testicular findings on ultrasound.  The right testicle had a palpable bump.  Scrotal ultrasound actually showed a lesion with hyperemia that could be orchitis versus mass.     Repeat renal ultrasound 6/19/2024 shows heterogeneous echotexture with hyperemia of the left testicle.     He has intermittent mild left scrotal pain in certain positions.  On exam there are no testicular masses but he does have a slightly softer texture left testicle.    Last visit I prescribed antibiotics x 4 weeks and repeat ultrasound. Scrotal US 7/30/24 shows improvement in heterogeneous region of left testicle.     He feels some improvement in left testicular sensitivity.  On examination the left testicle is softer than the right but there are no palpable masses or abnormalities.    PSA:  Lab Results   Component Value Date    PSA <0.01 04/25/2023    PSA <0.01 03/05/2020    PSA <0.01 08/31/2019    PSA <0.01 (L) 05/24/2018    PSA <0.010 (L) 12/14/2015    PSAS 0.10 03/12/2021    PSAS <0.01 (L) 02/04/2017    QPSA <0.1 11/01/2014    QPSA <0.1 03/22/2014        History:     Past Medical History:    Adenomatous colon polyp    Malignant neoplasm of prostate (HCC)    Prostate cancer (HCC)    Prostate cancer (HCC)       Past Surgical History:   Procedure Laterality Date    Cabg  2001    Cataract Right 10/2017    Cataract Left 11/2017    Colonoscopy  7/17, 10/12, 12/06, 1/01    Dr. Orta - repeat in 5yrs     Inner ear surgery proc unlisted  90    replaced bones in right middle ear    Other surgical history  01    prostate surgery    Sinus surgery    97    endoscopy w polypectomy    Tonsillectomy  56    Vasectomy  96       Family  History   Problem Relation Age of Onset    Heart Attack Father     Hypertension Mother        Social History     Socioeconomic History    Marital status:    Tobacco Use    Smoking status: Never    Smokeless tobacco: Never   Vaping Use    Vaping status: Never Used   Substance and Sexual Activity    Alcohol use: Yes     Alcohol/week: 1.7 standard drinks of alcohol     Types: 2 Standard drinks or equivalent per week     Comment: occ 1-2 glasses a week    Drug use: No   Other Topics Concern    Caffeine Concern No    Exercise Yes     Comment: 4x a week       Medications (Active prior to today's visit):  Current Outpatient Medications   Medication Sig Dispense Refill    Multiple Vitamins-Iron (ONE DAILY MULTIVITAMIN/IRON) Oral Tab Take by mouth.  0    PREVIDENT 5000 BOOSTER PLUS 1.1 % Dental Paste USE 1-2 TIMES DAILY IN PLACE OF REGULAR TOOTHPASTE. SPIT OUT & DO NOT RINSE. NO FOOD/DRINK X30 MIN      Metoprolol Succinate ER 25 MG Oral Tablet 24 Hr Take 1 tablet (25 mg total) by mouth daily.      TURMERIC OR Take 1 capsule by mouth daily.      Atorvastatin Calcium 20 MG Oral Tab Take 1 tablet (20 mg total) by mouth daily. 90 tablet 1    MetroNIDAZOLE 1 % External Gel Use as directed (Patient not taking: Reported on 10/9/2023)  99    omega-3 fatty acids (FISH OIL) 1000 MG Oral Cap Take 4,000 mg by mouth daily.      BABY ASPIRIN OR Take 1 tablet by mouth.      Cholecalciferol (VITAMIN D-3) 5000 UNITS Oral Tab Take 1 tablet (5,000 Units total) by mouth.      Multiple Vitamins-Minerals (MULTI-VITAMIN/MINERALS) Oral Tab Take 1 tablet by mouth daily.         Allergies:  Allergies   Allergen Reactions    Morphine NAUSEA ONLY     Derivatives      Sulfa Drugs Cross Reactors     Sulfa Antibiotics RASH     Only sulfa not antibiotics       Review of Systems:   A comprehensive 10-point review of systems was completed.  Pertinent positives and negatives are noted in the the HPI.    Physical Exam:   CONSTITUTIONAL: Well developed,  well nourished, in no acute distress  NEUROLOGIC: Alert and oriented  HEAD: Normocephalic, atraumatic  EYES: Sclera non-icteric  ENT: Hearing intact, moist mucous membranes  NECK: No obvious goiter or masses  RESPIRATORY: Normal respiratory effort  SKIN: No evident rashes  ABDOMEN: Soft, non-tender, non-distended, soft consistency of left testicle without any firm or abnormal lesions    Assessment & Plan:   Tony Crum is a 73 year old male with history of prostate cancer status post prostatectomy, advised ED on Trimix injections, vasectomy x 2 and vasectomy reversal who has been seen by NOEL Macdonald for testicular findings on ultrasound.  The right testicle had a palpable bump.  Scrotal ultrasound actually showed a lesion with hyperemia that could be orchitis versus mass.     Repeat renal ultrasound 6/19/2024 shows heterogeneous echotexture with hyperemia of the left testicle.     He has intermittent mild left scrotal pain in certain positions.  On exam there are no testicular masses but he does have a slightly softer texture left testicle.    Last visit I prescribed antibiotics x 4 weeks and repeat ultrasound. Scrotal US 7/30/24 shows improvement in heterogeneous region of left testicle.     He feels some improvement in left testicular sensitivity.  On examination the left testicle is softer than the right but there are no palpable masses or abnormalities.    -Discussed options of left orchiectomy versus repeat ultrasound in 2 months  -He elects to proceed with repeat ultrasound in 2 months given improvement on most recent ultrasound after antibiotics    In total, 30 minutes were spent on this patient encounter (including chart review, patient history, physical, and counseling, documentation, and communication).    J Carlos Huynh MD  Staff Urologist  [facility]  Office: [phone]

## 2024-09-26 ENCOUNTER — TELEPHONE (OUTPATIENT)
Dept: SURGERY | Facility: CLINIC | Age: 74
End: 2024-09-26

## 2024-09-26 NOTE — TELEPHONE ENCOUNTER
Patient states he needs to reschedule 10/1 follow up appointment to a date after 10/9. Patient has ultrasound on 10/8 and his follow up visit was to review this. Next available appointment is not until December. Please call

## 2024-09-27 NOTE — TELEPHONE ENCOUNTER
Pt returned the call to schedule follow up appointment after ultrasound.  No sooner appointments available.  Please call pt

## 2024-10-08 ENCOUNTER — HOSPITAL ENCOUNTER (OUTPATIENT)
Dept: ULTRASOUND IMAGING | Age: 74
Discharge: HOME | End: 2024-10-08
Attending: SURGERY
Payer: MEDICARE

## 2024-10-08 DIAGNOSIS — N45.2 ORCHITIS: ICD-10-CM

## 2024-10-08 PROCEDURE — 76870 US EXAM SCROTUM: CPT | Performed by: SURGERY

## 2024-10-08 PROCEDURE — 93975 VASCULAR STUDY: CPT | Performed by: SURGERY

## 2024-10-10 ENCOUNTER — OFFICE VISIT (OUTPATIENT)
Dept: SURGERY | Facility: CLINIC | Age: 74
End: 2024-10-10
Payer: MEDICARE

## 2024-10-10 DIAGNOSIS — N45.2 ORCHITIS: Primary | ICD-10-CM

## 2024-10-10 LAB
APPEARANCE: CLEAR
BILIRUBIN: NEGATIVE
GLUCOSE (URINE DIPSTICK): NEGATIVE MG/DL
KETONES (URINE DIPSTICK): NEGATIVE MG/DL
LEUKOCYTES: NEGATIVE
MULTISTIX LOT#: NORMAL NUMERIC
NITRITE, URINE: NEGATIVE
OCCULT BLOOD: NEGATIVE
PH, URINE: 6.5 (ref 4.5–8)
PROTEIN (URINE DIPSTICK): NEGATIVE MG/DL
SPECIFIC GRAVITY: 1.02 (ref 1–1.03)
URINE-COLOR: YELLOW
UROBILINOGEN,SEMI-QN: 0.2 MG/DL (ref 0–1.9)

## 2024-10-10 PROCEDURE — 81003 URINALYSIS AUTO W/O SCOPE: CPT | Performed by: SURGERY

## 2024-10-10 PROCEDURE — 99214 OFFICE O/P EST MOD 30 MIN: CPT | Performed by: SURGERY

## 2024-10-10 NOTE — PROGRESS NOTES
Urology Clinic Note    Primary Care Provider:  Levi Chin MD     Chief Complaint:   Testicular lesion     HPI:   Tony Crum is a 73 year old male with history of prostate cancer status post prostatectomy, advised ED on Trimix injections, vasectomy x 2 and vasectomy reversal who has been seen by NOEL Macdonald for testicular findings on ultrasound.  The right testicle had a palpable bump.  Scrotal ultrasound actually showed a lesion with hyperemia that could be orchitis versus mass.     Repeat renal ultrasound 6/19/2024 shows heterogeneous echotexture with hyperemia of the left testicle.     He has intermittent mild left scrotal pain in certain positions.  On exam there are no testicular masses but he does have a slightly softer texture left testicle.     Last visit I prescribed antibiotics x 4 weeks and repeat ultrasound. Scrotal US 7/30/24 shows improvement in heterogeneous region of left testicle.      He feels some improvement in left testicular sensitivity.  On examination the left testicle is softer than the right but there are no palpable masses or abnormalities.    He elected for another repeat ultrasound which was performed on 10/1/2024.  This showed severe heterogeneity of the left testicle, similar but slightly improved from prior ultrasound.  He feels no change in his exam and I agree on my exam today.  Left testicle feels slightly softer than the right but no palpable lumps or masses.  He gets mild intermittent tenderness but this is not terribly bothersome at this time.    PSA:  Lab Results   Component Value Date    PSA <0.01 04/25/2023    PSA <0.01 03/05/2020    PSA <0.01 08/31/2019    PSA <0.01 (L) 05/24/2018    PSA <0.010 (L) 12/14/2015    PSAS 0.10 03/12/2021    PSAS <0.01 (L) 02/04/2017    QPSA <0.1 11/01/2014    QPSA <0.1 03/22/2014        History:     Past Medical History:    Adenomatous colon polyp    Malignant neoplasm of prostate (HCC)    Prostate cancer (HCC)    Prostate cancer  (HCC)       Past Surgical History:   Procedure Laterality Date    Cabg  2001    Cataract Right 10/2017    Cataract Left 11/2017    Colonoscopy  7/17, 10/12, 12/06, 1/01    Dr. Orta - repeat in 5yrs     Inner ear surgery proc unlisted  90    replaced bones in right middle ear    Other surgical history  01    prostate surgery    Sinus surgery    97    endoscopy w polypectomy    Tonsillectomy  56    Vasectomy  96       Family History   Problem Relation Age of Onset    Heart Attack Father     Hypertension Mother        Social History     Socioeconomic History    Marital status:    Tobacco Use    Smoking status: Never    Smokeless tobacco: Never   Vaping Use    Vaping status: Never Used   Substance and Sexual Activity    Alcohol use: Yes     Alcohol/week: 1.7 standard drinks of alcohol     Types: 2 Standard drinks or equivalent per week     Comment: occ 1-2 glasses a week    Drug use: No   Other Topics Concern    Caffeine Concern No    Exercise Yes     Comment: 4x a week       Medications (Active prior to today's visit):  Current Outpatient Medications   Medication Sig Dispense Refill    Multiple Vitamins-Iron (ONE DAILY MULTIVITAMIN/IRON) Oral Tab Take by mouth.  0    PREVIDENT 5000 BOOSTER PLUS 1.1 % Dental Paste USE 1-2 TIMES DAILY IN PLACE OF REGULAR TOOTHPASTE. SPIT OUT & DO NOT RINSE. NO FOOD/DRINK X30 MIN      Metoprolol Succinate ER 25 MG Oral Tablet 24 Hr Take 1 tablet (25 mg total) by mouth daily.      TURMERIC OR Take 1 capsule by mouth daily.      Atorvastatin Calcium 20 MG Oral Tab Take 1 tablet (20 mg total) by mouth daily. 90 tablet 1    MetroNIDAZOLE 1 % External Gel Use as directed (Patient not taking: Reported on 10/9/2023)  99    omega-3 fatty acids (FISH OIL) 1000 MG Oral Cap Take 4,000 mg by mouth daily.      BABY ASPIRIN OR Take 1 tablet by mouth.      Cholecalciferol (VITAMIN D-3) 5000 UNITS Oral Tab Take 1 tablet (5,000 Units total) by mouth.      Multiple Vitamins-Minerals  (MULTI-VITAMIN/MINERALS) Oral Tab Take 1 tablet by mouth daily.         Allergies:  Allergies[1]    Review of Systems:   A comprehensive 10-point review of systems was completed.  Pertinent positives and negatives are noted in the the HPI.    Physical Exam:   CONSTITUTIONAL: Well developed, well nourished, in no acute distress  NEUROLOGIC: Alert and oriented  HEAD: Normocephalic, atraumatic  EYES: Sclera non-icteric  ENT: Hearing intact, moist mucous membranes  NECK: No obvious goiter or masses  RESPIRATORY: Normal respiratory effort  SKIN: No evident rashes  ABDOMEN: Soft, non-tender, non-distended  GENITOURINARY: Normal phallus, orthotopic meatus, soft bilateral testicles left greater than right, no palpable nodules or masses    Assessment & Plan:   Tony Crum is a 73 year old male with history of prostate cancer status post prostatectomy, advised ED on Trimix injections, vasectomy x 2 and vasectomy reversal who has been seen by NOEL Macdonald for testicular findings on ultrasound.  The right testicle had a palpable bump.  Scrotal ultrasound actually showed a lesion with hyperemia that could be orchitis versus mass.     Repeat renal ultrasound 6/19/2024 shows heterogeneous echotexture with hyperemia of the left testicle.     He has intermittent mild left scrotal pain in certain positions.  On exam there are no testicular masses but he does have a slightly softer texture left testicle.     Last visit I prescribed antibiotics x 4 weeks and repeat ultrasound. Scrotal US 7/30/24 shows improvement in heterogeneous region of left testicle.      He feels some improvement in left testicular sensitivity.  On examination the left testicle is softer than the right but there are no palpable masses or abnormalities.    He elected for another repeat ultrasound which was performed on 10/1/2024.  This showed severe heterogeneity of the left testicle, similar but slightly improved from prior ultrasound.  He feels no change in  his exam and I agree on my exam today.  Left testicle feels slightly softer than the right but no palpable lumps or masses.  He gets mild intermittent tenderness but this is not terribly bothersome at this time.    -Continue self exams  -Return to clinic in 1 year with repeat ultrasound prior    In total, 30 minutes were spent on this patient encounter (including chart review, patient history, physical, and counseling, documentation, and communication).    J Carlos Huynh MD  Staff Urologist  [facility]  Office: [phone]             [1]   Allergies  Allergen Reactions    Morphine NAUSEA ONLY     Derivatives      Sulfa Drugs Cross Reactors     Sulfa Antibiotics RASH     Only sulfa not antibiotics

## 2024-11-05 ENCOUNTER — APPOINTMENT (OUTPATIENT)
Dept: URBAN - METROPOLITAN AREA CLINIC 249 | Age: 74
Setting detail: DERMATOLOGY
End: 2024-11-12

## 2024-11-05 ENCOUNTER — RX ONLY (RX ONLY)
Age: 74
End: 2024-11-05

## 2024-11-05 DIAGNOSIS — D22 MELANOCYTIC NEVI: ICD-10-CM

## 2024-11-05 DIAGNOSIS — L21.8 OTHER SEBORRHEIC DERMATITIS: ICD-10-CM

## 2024-11-05 DIAGNOSIS — Z71.89 OTHER SPECIFIED COUNSELING: ICD-10-CM

## 2024-11-05 DIAGNOSIS — L82.1 OTHER SEBORRHEIC KERATOSIS: ICD-10-CM

## 2024-11-05 DIAGNOSIS — Z85.828 PERSONAL HISTORY OF OTHER MALIGNANT NEOPLASM OF SKIN: ICD-10-CM

## 2024-11-05 DIAGNOSIS — L81.4 OTHER MELANIN HYPERPIGMENTATION: ICD-10-CM

## 2024-11-05 DIAGNOSIS — B02.9 ZOSTER WITHOUT COMPLICATIONS: ICD-10-CM

## 2024-11-05 DIAGNOSIS — L71.8 OTHER ROSACEA: ICD-10-CM

## 2024-11-05 DIAGNOSIS — D18.0 HEMANGIOMA: ICD-10-CM

## 2024-11-05 PROBLEM — D22.72 MELANOCYTIC NEVI OF LEFT LOWER LIMB, INCLUDING HIP: Status: ACTIVE | Noted: 2024-11-05

## 2024-11-05 PROBLEM — D22.5 MELANOCYTIC NEVI OF TRUNK: Status: ACTIVE | Noted: 2024-11-05

## 2024-11-05 PROBLEM — D22.71 MELANOCYTIC NEVI OF RIGHT LOWER LIMB, INCLUDING HIP: Status: ACTIVE | Noted: 2024-11-05

## 2024-11-05 PROBLEM — D18.01 HEMANGIOMA OF SKIN AND SUBCUTANEOUS TISSUE: Status: ACTIVE | Noted: 2024-11-05

## 2024-11-05 PROCEDURE — OTHER MIPS QUALITY: OTHER

## 2024-11-05 PROCEDURE — 99214 OFFICE O/P EST MOD 30 MIN: CPT

## 2024-11-05 PROCEDURE — OTHER COUNSELING: OTHER

## 2024-11-05 PROCEDURE — OTHER PRESCRIPTION: OTHER

## 2024-11-05 PROCEDURE — OTHER PRESCRIPTION MEDICATION MANAGEMENT: OTHER

## 2024-11-05 RX ORDER — ACYCLOVIR 50 MG/G
CREAM TOPICAL
Qty: 5 | Refills: 7 | Status: ERX | COMMUNITY
Start: 2024-11-05

## 2024-11-05 RX ORDER — VALACYCLOVIR 1 G/1
TABLET, FILM COATED ORAL
Qty: 4 | Refills: 3 | Status: CANCELLED | COMMUNITY
Start: 2024-11-05

## 2024-11-05 RX ORDER — TRIAMCINOLONE ACETONIDE 1 MG/ML
LOTION TOPICAL
Qty: 60 | Refills: 4 | Status: ERX | COMMUNITY
Start: 2024-11-05

## 2024-11-05 RX ORDER — VALACYCLOVIR 1 G/1
TABLET, FILM COATED ORAL
Qty: 12 | Refills: 5 | Status: ERX | COMMUNITY
Start: 2024-11-05

## 2024-11-05 ASSESSMENT — LOCATION SIMPLE DESCRIPTION DERM
LOCATION SIMPLE: RIGHT PRETIBIAL REGION
LOCATION SIMPLE: RIGHT THIGH
LOCATION SIMPLE: ABDOMEN
LOCATION SIMPLE: LEFT CHEEK
LOCATION SIMPLE: SCALP
LOCATION SIMPLE: LEFT THIGH
LOCATION SIMPLE: RIGHT FOREARM
LOCATION SIMPLE: RIGHT UPPER BACK
LOCATION SIMPLE: LEFT FOREARM
LOCATION SIMPLE: LEFT SHOULDER
LOCATION SIMPLE: RIGHT CHEEK
LOCATION SIMPLE: LEFT PRETIBIAL REGION

## 2024-11-05 ASSESSMENT — LOCATION ZONE DERM
LOCATION ZONE: FACE
LOCATION ZONE: SCALP
LOCATION ZONE: TRUNK
LOCATION ZONE: ARM
LOCATION ZONE: LEG

## 2024-11-05 ASSESSMENT — LOCATION DETAILED DESCRIPTION DERM
LOCATION DETAILED: RIGHT INFERIOR MEDIAL UPPER BACK
LOCATION DETAILED: RIGHT DISTAL DORSAL FOREARM
LOCATION DETAILED: RIGHT CENTRAL MALAR CHEEK
LOCATION DETAILED: EPIGASTRIC SKIN
LOCATION DETAILED: LEFT ANTERIOR DISTAL THIGH
LOCATION DETAILED: LEFT PROXIMAL DORSAL FOREARM
LOCATION DETAILED: PERIUMBILICAL SKIN
LOCATION DETAILED: LEFT PROXIMAL PRETIBIAL REGION
LOCATION DETAILED: RIGHT SUPERIOR MEDIAL UPPER BACK
LOCATION DETAILED: RIGHT ANTERIOR DISTAL THIGH
LOCATION DETAILED: RIGHT SUPERIOR PARIETAL SCALP
LOCATION DETAILED: LEFT ANTERIOR SHOULDER
LOCATION DETAILED: RIGHT PROXIMAL PRETIBIAL REGION
LOCATION DETAILED: RIGHT CENTRAL POSTAURICULAR SKIN
LOCATION DETAILED: LEFT CENTRAL MALAR CHEEK

## 2024-11-05 NOTE — PROCEDURE: MIPS QUALITY
Quality 130: Documentation Of Current Medications In The Medical Record: Current Medications Documented
Quality 431: Preventive Care And Screening: Unhealthy Alcohol Use - Screening: Patient not identified as an unhealthy alcohol user when screened for unhealthy alcohol use using a systematic screening method
Detail Level: Detailed
Quality 226: Preventive Care And Screening: Tobacco Use: Screening And Cessation Intervention: Patient screened for tobacco use and is an ex/non-smoker
Quality 110: Preventive Care And Screening: Influenza Immunization: Influenza Immunization previously received during influenza season
Quality 111:Pneumonia Vaccination Status For Older Adults: Patient received any pneumococcal conjugate or polysaccharide vaccine on or after their 60th birthday and before the end of the measurement period
Quality 402: Tobacco Use And Help With Quitting Among Adolescents: Patient screened for tobacco and never smoked

## 2024-11-05 NOTE — PROCEDURE: PRESCRIPTION MEDICATION MANAGEMENT
Render In Strict Bullet Format?: No
Detail Level: Zone
Initiate Treatment: triamcinolone acetonide 0.1 % lotion \\nSig: Apply to AA on scalp once daily for 2 weeks or until clear
Initiate Treatment: valacyclovir 1 gram tablet \\nSig: Take 2 tablets in the AM and 2 tablets in the PM\\n\\nZovirax 5 % topical cream \\nSig: When flare apply to AA 5x a day for 4 days

## 2024-11-11 ENCOUNTER — PATIENT MESSAGE (OUTPATIENT)
Dept: SURGERY | Facility: CLINIC | Age: 74
End: 2024-11-11

## 2024-11-11 ENCOUNTER — TELEPHONE (OUTPATIENT)
Dept: SURGERY | Facility: CLINIC | Age: 74
End: 2024-11-11

## 2024-11-11 NOTE — TELEPHONE ENCOUNTER
Pt called to requesting an order for [facility].  [facility]  Please call

## 2024-11-12 RX ORDER — SYRINGE-NEEDLE,INSULIN,0.5 ML 27GX1/2"
SYRINGE, EMPTY DISPOSABLE MISCELLANEOUS
Qty: 20 EACH | Refills: 1 | Status: SHIPPED | OUTPATIENT
Start: 2024-11-12

## 2024-11-12 NOTE — TELEPHONE ENCOUNTER
Order resent to [facility] for syringes and refill of Timix #13    If you have questions and concerns, you may call them at [phone].  Updated patient via Zoe Majeste

## 2024-11-13 ENCOUNTER — OFFICE VISIT (OUTPATIENT)
Dept: FAMILY MEDICINE CLINIC | Facility: CLINIC | Age: 74
End: 2024-11-13
Payer: MEDICARE

## 2024-11-13 VITALS
TEMPERATURE: 98 F | HEART RATE: 62 BPM | SYSTOLIC BLOOD PRESSURE: 116 MMHG | BODY MASS INDEX: 29.81 KG/M2 | HEIGHT: 68.5 IN | DIASTOLIC BLOOD PRESSURE: 74 MMHG | RESPIRATION RATE: 18 BRPM | WEIGHT: 199 LBS

## 2024-11-13 DIAGNOSIS — R41.3 MEMORY CHANGES: Primary | ICD-10-CM

## 2024-11-13 PROCEDURE — G2211 COMPLEX E/M VISIT ADD ON: HCPCS | Performed by: FAMILY MEDICINE

## 2024-11-13 PROCEDURE — 99213 OFFICE O/P EST LOW 20 MIN: CPT | Performed by: FAMILY MEDICINE

## 2024-11-13 RX ORDER — TRIAMCINOLONE ACETONIDE 1 MG/ML
LOTION TOPICAL 2 TIMES DAILY
COMMUNITY
Start: 2024-11-07

## 2024-11-13 RX ORDER — VALACYCLOVIR HYDROCHLORIDE 1 G/1
1000 TABLET, FILM COATED ORAL EVERY 12 HOURS SCHEDULED
COMMUNITY
Start: 2024-11-05

## 2024-11-13 NOTE — PROGRESS NOTES
[facility] Family Medicine Office Note  Chief Complaint:   Chief Complaint   Patient presents with    Consult     Pt would like to discuss getting tested or talking to someone regarding Dementia.        HPI:   This is a 74 year old male coming in for concerns in regards to his memory.  The patient states that he at times forgets people's names and sometimes forgets what he is doing in certain rooms.  He states that he is concerned due to the story he heard from his brother-in-law about a person that had progressed to having dementia fairly quickly and they informed him that they could have done some interventions to help with this.  He states he would like to know if he should be tested and what he can do to decrease his risk.      Past Medical History:    Adenomatous colon polyp    Malignant neoplasm of prostate (HCC)    Prostate cancer (HCC)    Prostate cancer (HCC)     Past Surgical History:   Procedure Laterality Date    Cabg  2001    Cataract Right 10/2017    Cataract Left 11/2017    Colonoscopy  7/17, 10/12, 12/06, 1/01    Dr. Orta - repeat in 5yrs     Inner ear surgery proc unlisted  90    replaced bones in right middle ear    Other surgical history  01    prostate surgery    Sinus surgery    97    endoscopy w polypectomy    Tonsillectomy  56    Vasectomy  96     Social History:  Social History     Socioeconomic History    Marital status:    Tobacco Use    Smoking status: Never    Smokeless tobacco: Never   Vaping Use    Vaping status: Never Used   Substance and Sexual Activity    Alcohol use: Yes     Alcohol/week: 1.7 standard drinks of alcohol     Types: 2 Standard drinks or equivalent per week     Comment: occ 1-2 glasses a week    Drug use: No   Other Topics Concern    Caffeine Concern No    Exercise Yes     Comment: 4x a week     Family History:  Family History   Problem Relation Age of Onset    Heart Attack Father     Hypertension Mother      Allergies:  Allergies[1]  Current  Meds:  Current Outpatient Medications   Medication Sig Dispense Refill    valACYclovir 1 G Oral Tab Take 1 tablet (1,000 mg total) by mouth every 12 (twelve) hours.      triamcinolone 0.1 % External Lotion Apply topically 2 (two) times daily.      Insulin Syringe-Needle U-100 (BD INSULIN SYRINGE U/F) 31G X 5/16\" 1 ML Does not apply Misc To be use for Trimix injection 20 each 1    Multiple Vitamins-Iron (ONE DAILY MULTIVITAMIN/IRON) Oral Tab Take by mouth.  0    PREVIDENT 5000 BOOSTER PLUS 1.1 % Dental Paste USE 1-2 TIMES DAILY IN PLACE OF REGULAR TOOTHPASTE. SPIT OUT & DO NOT RINSE. NO FOOD/DRINK X30 MIN      Metoprolol Succinate ER 25 MG Oral Tablet 24 Hr Take 1 tablet (25 mg total) by mouth daily.      TURMERIC OR Take 1 capsule by mouth daily.      Atorvastatin Calcium 20 MG Oral Tab Take 1 tablet (20 mg total) by mouth daily. 90 tablet 1    MetroNIDAZOLE 1 % External Gel Use as directed  99    omega-3 fatty acids (FISH OIL) 1000 MG Oral Cap Take 4,000 mg by mouth daily.      BABY ASPIRIN OR Take 1 tablet by mouth.      Cholecalciferol (VITAMIN D-3) 5000 UNITS Oral Tab Take 1 tablet (5,000 Units total) by mouth.        Counseling given: Not Answered       REVIEW OF SYSTEMS:   Consitutional: No fevers, chills, sweats  Eye: No recent visual problems  ENMT: No ear pain nasal congestion sore throat  Respiratory: No shortness of breath, cough  Cardiovascular: No chest pain palpitations syncope  Gastrointestinal: No nausea vomiting diarrhea  Genitourinary: No hematuria  Hema/Lymph no bruising tendency, swollen lymph glands  Endocrine: Negative for excessive thirst excessive hunger  Musculoskeletal: No back pain neck pain joint pain muscle pain decreased range of motion  Integumentary: No rash, pruritus, abrasions  Neurologic: Alert and oriented x4  Psychiatric: No anxiety, depression    Medical, surgical, family, and social histories were reviewed      EXAM:   VITALS:   Vitals:    11/13/24 1430   BP: 116/74   Pulse: 62    Resp: 18   Temp: 97.6 °F (36.4 °C)      GENERAL: well developed, well nourished, in no apparent distress  SKIN: no rashes, no suspicious lesions: Cool and Dry  HEENT: atraumatic, normocephalic, ears and throat are clear    Ears: TM's clear and visible bilaterally, no excess cerumen or erythema.   EYES: Pupils equal round and reactive.  Extraocular motions intact no scleral icterus no injection or drainage  THROAT without erythema tonsillar hypertrophy or exudate.  Uvula midline airway patent  NECK: Given midline.  No JVD or lymphadenopathy supple nontender no meningeal signs   LUNGS: clear to auscultation sounds equal bilaterally no wheezes rales or rhonchi  CARDIO: Regular rate and rhythm without murmurs gallops or rubs  NEURO: Awake and alert.  Cranial nerves II through XII intact motor and sensory grossly within normal limits.  5 out of 5 muscle strength in all muscle groups.  Normal speech.  PSYCHIATRIC: Awake, alert and oriented x3, cooperative appropriate mood and affect.  Judgment intact       ASSESSMENT AND PLAN:   1. Memory changes  I did discuss with the patient that at this point would suggest for him to continue to try to focus his efforts on activities that would keep his mind active.  Testing at this point would not be beneficial was asked to continue to complete these exercises in the future if he would like to have some testing done we can go ahead ordered for him.    Meds & Refills for this Visit:  Requested Prescriptions      No prescriptions requested or ordered in this encounter       Health Maintenance:  Health Maintenance Due   Topic Date Due    COVID-19 Vaccine (1 - 2024-25 season) Never done    Influenza Vaccine (1) 10/01/2024       Patient/Caregiver Education: Patient/Caregiver Education: There are no barriers to learning. Medical education done.   Outcome: Patient verbalizes understanding. Patient is notified to call with any questions, complications, allergies, or worsening or changing  symptoms.  Patient is to call with any side effects or complications from the treatments as a result of today.     Problem List:  Patient Active Problem List   Diagnosis    Screening for genitourinary condition    Screening for iron deficiency anemia    Carbuncle and furuncle of unspecified site    Encounter for long-term (current) use of other medications    Coronary atherosclerosis    Pure hypercholesterolemia    Annual physical exam    ED (erectile dysfunction)    Asymmetric SNHL (sensorineural hearing loss)    CAD in native artery    Achilles tendinitis of both lower extremities    Closed fracture of distal phalanx or phalanges of hand    Contracture of both Achilles tendons    Finger pain, right    Mobitz type 1 second degree AV block    Posterior calcaneal exostosis    Arteriosclerosis of coronary artery    Calcaneal spur, right foot    Tenderness of right knee    Prostate cancer (HCC)         No follow-ups on file.     Levi Chin MD    Please note that portions of this note may have been completed with a voice recognition program. Efforts were made to edit the dictations but occasionally words are mis-transcribed.       [1]   Allergies  Allergen Reactions    Morphine NAUSEA ONLY     Derivatives      Sulfa Drugs Cross Reactors     Sulfa Antibiotics RASH     Only sulfa not antibiotics

## 2024-11-19 RX ORDER — CIPROFLOXACIN 500 MG/1
500 TABLET, FILM COATED ORAL 2 TIMES DAILY
Qty: 56 TABLET | Refills: 0 | OUTPATIENT
Start: 2024-11-19 | End: 2024-12-17

## 2025-05-12 ENCOUNTER — TELEPHONE (OUTPATIENT)
Dept: FAMILY MEDICINE CLINIC | Facility: CLINIC | Age: 75
End: 2025-05-12

## 2025-05-12 NOTE — TELEPHONE ENCOUNTER
Patient called requesting for   add to the blood tests PSA And BRCA where he can present the result to his prostate doctor.     Please advise.

## 2025-05-13 NOTE — TELEPHONE ENCOUNTER
I called the patient and informed him he will need urology to place the order for the BRCA. The patient stated he has not seen a urologist regarding his prostate for years since he has been cancer free for over 10 years. He saw Dr. Huynh last year for a testicular issue and has a follow up in October but nothing related to his prostate. Please reconsider placing order.

## 2025-05-13 NOTE — TELEPHONE ENCOUNTER
Please see note below and place order for BRCA if in agreement. The order for the PSA is already in the computer.

## 2025-05-27 ENCOUNTER — PATIENT MESSAGE (OUTPATIENT)
Dept: FAMILY MEDICINE CLINIC | Facility: CLINIC | Age: 75
End: 2025-05-27

## 2025-05-27 ENCOUNTER — LAB ENCOUNTER (OUTPATIENT)
Dept: LAB | Facility: HOSPITAL | Age: 75
End: 2025-05-27
Attending: FAMILY MEDICINE
Payer: MEDICARE

## 2025-05-27 DIAGNOSIS — I25.10 CAD (CORONARY ARTERY DISEASE): Primary | ICD-10-CM

## 2025-05-27 DIAGNOSIS — C61 PROSTATE CANCER (HCC): ICD-10-CM

## 2025-05-27 DIAGNOSIS — E78.00 PURE HYPERCHOLESTEROLEMIA: ICD-10-CM

## 2025-05-27 LAB
ALBUMIN SERPL-MCNC: 4.4 G/DL (ref 3.2–4.8)
ALBUMIN/GLOB SERPL: 1.8 {RATIO} (ref 1–2)
ALP LIVER SERPL-CCNC: 87 U/L (ref 45–117)
ALT SERPL-CCNC: 21 U/L (ref 10–49)
ANION GAP SERPL CALC-SCNC: 5 MMOL/L (ref 0–18)
AST SERPL-CCNC: 24 U/L (ref ?–34)
BILIRUB SERPL-MCNC: 1 MG/DL (ref 0.2–1.1)
BUN BLD-MCNC: 16 MG/DL (ref 9–23)
CALCIUM BLD-MCNC: 9.9 MG/DL (ref 8.7–10.6)
CHLORIDE SERPL-SCNC: 106 MMOL/L (ref 98–112)
CHOLEST SERPL-MCNC: 126 MG/DL (ref ?–200)
CO2 SERPL-SCNC: 30 MMOL/L (ref 21–32)
CREAT BLD-MCNC: 0.96 MG/DL (ref 0.7–1.3)
EGFRCR SERPLBLD CKD-EPI 2021: 83 ML/MIN/1.73M2 (ref 60–?)
FASTING PATIENT LIPID ANSWER: YES
FASTING STATUS PATIENT QL REPORTED: YES
GLOBULIN PLAS-MCNC: 2.4 G/DL (ref 2–3.5)
GLUCOSE BLD-MCNC: 97 MG/DL (ref 70–99)
HDLC SERPL-MCNC: 36 MG/DL (ref 40–59)
LDLC SERPL CALC-MCNC: 76 MG/DL (ref ?–100)
NONHDLC SERPL-MCNC: 90 MG/DL (ref ?–130)
OSMOLALITY SERPL CALC.SUM OF ELEC: 293 MOSM/KG (ref 275–295)
POTASSIUM SERPL-SCNC: 4.2 MMOL/L (ref 3.5–5.1)
PROT SERPL-MCNC: 6.8 G/DL (ref 5.7–8.2)
PSA SERPL-MCNC: <0.04 NG/ML (ref ?–4)
SODIUM SERPL-SCNC: 141 MMOL/L (ref 136–145)
TRIGL SERPL-MCNC: 70 MG/DL (ref 30–149)
VLDLC SERPL CALC-MCNC: 11 MG/DL (ref 0–30)

## 2025-05-27 PROCEDURE — 80061 LIPID PANEL: CPT

## 2025-05-27 PROCEDURE — 80053 COMPREHEN METABOLIC PANEL: CPT

## 2025-05-27 PROCEDURE — 84153 ASSAY OF PSA TOTAL: CPT

## 2025-05-27 PROCEDURE — 36415 COLL VENOUS BLD VENIPUNCTURE: CPT

## 2025-06-02 ENCOUNTER — HOSPITAL ENCOUNTER (OUTPATIENT)
Dept: GENERAL RADIOLOGY | Age: 75
Discharge: HOME | End: 2025-06-02
Attending: FAMILY MEDICINE
Payer: MEDICARE

## 2025-06-02 ENCOUNTER — OFFICE VISIT (OUTPATIENT)
Dept: FAMILY MEDICINE CLINIC | Facility: CLINIC | Age: 75
End: 2025-06-02
Payer: MEDICARE

## 2025-06-02 VITALS
SYSTOLIC BLOOD PRESSURE: 120 MMHG | HEIGHT: 68.5 IN | WEIGHT: 199.38 LBS | OXYGEN SATURATION: 97 % | DIASTOLIC BLOOD PRESSURE: 74 MMHG | RESPIRATION RATE: 16 BRPM | HEART RATE: 64 BPM | BODY MASS INDEX: 29.87 KG/M2 | TEMPERATURE: 97 F

## 2025-06-02 DIAGNOSIS — R05.9 COUGH, UNSPECIFIED TYPE: ICD-10-CM

## 2025-06-02 DIAGNOSIS — R05.9 COUGH, UNSPECIFIED TYPE: Primary | ICD-10-CM

## 2025-06-02 PROCEDURE — G2211 COMPLEX E/M VISIT ADD ON: HCPCS | Performed by: FAMILY MEDICINE

## 2025-06-02 PROCEDURE — 71046 X-RAY EXAM CHEST 2 VIEWS: CPT | Performed by: FAMILY MEDICINE

## 2025-06-02 PROCEDURE — 99214 OFFICE O/P EST MOD 30 MIN: CPT | Performed by: FAMILY MEDICINE

## 2025-06-02 RX ORDER — CYCLOSPORINE OPHTHALMIC SOLUTION 1 MG/ML
SOLUTION/ DROPS OPHTHALMIC
COMMUNITY
Start: 2025-05-09

## 2025-06-02 RX ORDER — CODEINE PHOSPHATE AND GUAIFENESIN 10; 100 MG/5ML; MG/5ML
5 SOLUTION ORAL EVERY 6 HOURS PRN
Qty: 150 ML | Refills: 0 | Status: SHIPPED | OUTPATIENT
Start: 2025-06-02

## 2025-06-02 RX ORDER — FLUTICASONE PROPIONATE 50 MCG
1 SPRAY, SUSPENSION (ML) NASAL 2 TIMES DAILY
Qty: 1 EACH | Refills: 0 | Status: SHIPPED | OUTPATIENT
Start: 2025-06-02 | End: 2026-05-28

## 2025-06-02 NOTE — PROGRESS NOTES
[facility] Family Medicine Office Note  Chief Complaint:   Chief Complaint   Patient presents with    Cough       HPI:   This is a 74 year old male coming in for cough the patient states that this started about 7 days ago.  States that he has not had any fever denies any shortness of breath has had some congestion.  States the cough gets worse whenever he lays down.  Patient did call the ENT and they did provide antibiotics for him.      Past Medical History[1]  Past Surgical History[2]  Social History:  Short Social Hx on File[3]  Family History:  Family History[4]  Allergies:  Allergies[5]  Current Meds:  Current Medications[6]   Counseling given: Not Answered       REVIEW OF SYSTEMS:   Consitutional: No fevers, chills, sweats  Eye: No recent visual problems  ENMT: No ear pain positive nasal congestion no sore throat  Respiratory: No shortness of breath, positive cough  Cardiovascular: No chest pain palpitations syncope  Gastrointestinal: No nausea vomiting diarrhea  Genitourinary: No hematuria  Hema/Lymph no bruising tendency, swollen lymph glands  Endocrine: Negative for excessive thirst excessive hunger       Medical, surgical, family, and social histories were reviewed      EXAM:     VITALS:   Vitals:    06/02/25 1111   BP: 120/74   Pulse: 64   Resp: 16   Temp: 97.3 °F (36.3 °C)      GENERAL: well developed, well nourished, in no apparent distress  SKIN: no rashes, no suspicious lesions: Cool and Dry  HEENT: atraumatic, normocephalic, ears and throat are clear    Ears: TM's clear and visible bilaterally, no excess cerumen or erythema.   EYES: Pupils equal round and reactive.  Extraocular motions intact no scleral icterus no injection or drainage  THROAT without erythema tonsillar hypertrophy or exudate.  Uvula midline airway patent  NECK: Given midline.  No JVD or lymphadenopathy supple nontender no meningeal signs   LUNGS: clear to auscultation sounds equal bilaterally no wheezes rales or  rhonchi  CARDIO: Regular rate and rhythm without murmurs gallops or rubs       ASSESSMENT AND PLAN:   1. Cough, unspecified type  - XR CHEST PA + LAT CHEST (CPT=71046); Future  - guaiFENesin-codeine 100-10 MG/5ML Oral Solution; Take 5 mL by mouth every 6 (six) hours as needed for cough.  Dispense: 150 mL; Refill: 0  - fluticasone propionate 50 MCG/ACT Nasal Suspension; 1 spray by Each Nare route in the morning and 1 spray before bedtime.  Dispense: 1 each; Refill: 0  I did discuss with the patient that this is likely viral.  This is likely why he is not seeing any improvement with the antibiotics he has been on cefdinir for the last week he recently started Augmentin's were both provided by ENT.  I did discuss for him to have a chest x-ray today was asked to use Flonase twice a day as well as Cheratussin I did caution that this may cause some drowsiness and not to use the medication while driving was asked to follow-up if he has any continued symptoms.    Meds & Refills for this Visit:  Requested Prescriptions     Signed Prescriptions Disp Refills    guaiFENesin-codeine 100-10 MG/5ML Oral Solution 150 mL 0     Sig: Take 5 mL by mouth every 6 (six) hours as needed for cough.    fluticasone propionate 50 MCG/ACT Nasal Suspension 1 each 0     Si spray by Each Nare route in the morning and 1 spray before bedtime.       Health Maintenance:  Health Maintenance Due   Topic Date Due    COVID-19 Vaccine (1 - 2024-25 season) Never done    Annual Depression Screening  2025    Fall Risk Screening (Annual)  2025    Annual Physical  2025       Patient/Caregiver Education: Patient/Caregiver Education: There are no barriers to learning. Medical education done.   Outcome: Patient verbalizes understanding. Patient is notified to call with any questions, complications, allergies, or worsening or changing symptoms.  Patient is to call with any side effects or complications from the treatments as a result of today.      Problem List:  Problem List[7]      No follow-ups on file.     Levi Chin MD    Please note that portions of this note may have been completed with a voice recognition program. Efforts were made to edit the dictations but occasionally words are mis-transcribed.       [1]   Past Medical History:   Adenomatous colon polyp    Malignant neoplasm of prostate (HCC)    Prostate cancer (HCC)    Prostate cancer (HCC)   [2]   Past Surgical History:  Procedure Laterality Date    Cabg  2001    Cataract Right 10/2017    Cataract Left 11/2017    Colonoscopy  7/17, 10/12, 12/06, 1/01    Dr. Orta - repeat in 5yrs     Inner ear surgery proc unlisted  90    replaced bones in right middle ear    Other surgical history  01    prostate surgery    Sinus surgery    97    endoscopy w polypectomy    Tonsillectomy  56    Vasectomy  96   [3]   Social History  Socioeconomic History    Marital status:    Tobacco Use    Smoking status: Never    Smokeless tobacco: Never   Vaping Use    Vaping status: Never Used   Substance and Sexual Activity    Alcohol use: Yes     Alcohol/week: 1.7 standard drinks of alcohol     Types: 2 Standard drinks or equivalent per week     Comment: occ 1-2 glasses a week    Drug use: No   Other Topics Concern    Caffeine Concern No    Exercise Yes     Comment: 4x a week   [4]   Family History  Problem Relation Age of Onset    Heart Attack Father     Hypertension Mother    [5]   Allergies  Allergen Reactions    Morphine NAUSEA ONLY     Derivatives      Sulfa Drugs Cross Reactors     Sulfa Antibiotics RASH     Only sulfa not antibiotics   [6]   Current Outpatient Medications   Medication Sig Dispense Refill    VEVYE 0.1 % Ophthalmic Solution INSTILL ONE DROP IN BOTH EYE(S) TWICE DAILY      amoxicillin clavulanate 875-125 MG Oral Tab Take 1 tablet by mouth 2 (two) times daily.      guaiFENesin-codeine 100-10 MG/5ML Oral Solution Take 5 mL by mouth every 6 (six) hours as needed for cough. 150 mL 0     fluticasone propionate 50 MCG/ACT Nasal Suspension 1 spray by Each Nare route in the morning and 1 spray before bedtime. 1 each 0    valACYclovir 1 G Oral Tab Take 1 tablet (1,000 mg total) by mouth every 12 (twelve) hours.      triamcinolone 0.1 % External Lotion Apply topically 2 (two) times daily.      Insulin Syringe-Needle U-100 (BD INSULIN SYRINGE U/F) 31G X 5/16\" 1 ML Does not apply Misc To be use for Trimix injection 20 each 1    Multiple Vitamins-Iron (ONE DAILY MULTIVITAMIN/IRON) Oral Tab Take by mouth.  0    PREVIDENT 5000 BOOSTER PLUS 1.1 % Dental Paste USE 1-2 TIMES DAILY IN PLACE OF REGULAR TOOTHPASTE. SPIT OUT & DO NOT RINSE. NO FOOD/DRINK X30 MIN      Metoprolol Succinate ER 25 MG Oral Tablet 24 Hr Take 1 tablet (25 mg total) by mouth daily.      TURMERIC OR Take 1 capsule by mouth daily.      Atorvastatin Calcium 20 MG Oral Tab Take 1 tablet (20 mg total) by mouth daily. 90 tablet 1    MetroNIDAZOLE 1 % External Gel Use as directed  99    omega-3 fatty acids (FISH OIL) 1000 MG Oral Cap Take 4,000 mg by mouth daily.      BABY ASPIRIN OR Take 1 tablet by mouth.      Cholecalciferol (VITAMIN D-3) 5000 UNITS Oral Tab Take 1 tablet (5,000 Units total) by mouth.     [7]   Patient Active Problem List  Diagnosis    Screening for genitourinary condition    Screening for iron deficiency anemia    Carbuncle and furuncle of unspecified site    Encounter for long-term (current) use of other medications    Coronary atherosclerosis    Pure hypercholesterolemia    Annual physical exam    ED (erectile dysfunction)    Asymmetric SNHL (sensorineural hearing loss)    CAD in native artery    Achilles tendinitis of both lower extremities    Closed fracture of distal phalanx or phalanges of hand    Contracture of both Achilles tendons    Finger pain, right    Mobitz type 1 second degree AV block    Posterior calcaneal exostosis    Arteriosclerosis of coronary artery    Calcaneal spur, right foot    Tenderness of right  knee    Prostate cancer (HCC)

## 2025-06-02 NOTE — TELEPHONE ENCOUNTER
Called patient back and he states he feeling much better.  States after using the Flonase, it helped dry up his mucus and feeling better.  States he was just concern about being around his friends who immuno compromise.  Advised patient patient to continue to take precautionary steps since we haven't receive his chest xray yet.  He voiced understanding and agreed with plan.

## 2025-06-03 ENCOUNTER — PATIENT MESSAGE (OUTPATIENT)
Dept: FAMILY MEDICINE CLINIC | Facility: CLINIC | Age: 75
End: 2025-06-03

## 2025-06-17 ENCOUNTER — PATIENT MESSAGE (OUTPATIENT)
Dept: SURGERY | Facility: CLINIC | Age: 75
End: 2025-06-17

## 2025-07-09 ENCOUNTER — OFFICE VISIT (OUTPATIENT)
Dept: FAMILY MEDICINE CLINIC | Facility: CLINIC | Age: 75
End: 2025-07-09
Payer: MEDICARE

## 2025-07-09 VITALS
TEMPERATURE: 98 F | HEART RATE: 67 BPM | BODY MASS INDEX: 27.57 KG/M2 | SYSTOLIC BLOOD PRESSURE: 126 MMHG | WEIGHT: 184 LBS | DIASTOLIC BLOOD PRESSURE: 70 MMHG | RESPIRATION RATE: 18 BRPM | HEIGHT: 68.5 IN

## 2025-07-09 DIAGNOSIS — E78.00 PURE HYPERCHOLESTEROLEMIA: ICD-10-CM

## 2025-07-09 DIAGNOSIS — C61 PROSTATE CANCER (HCC): ICD-10-CM

## 2025-07-09 DIAGNOSIS — Z00.00 MEDICARE ANNUAL WELLNESS VISIT, SUBSEQUENT: Primary | ICD-10-CM

## 2025-07-09 NOTE — PROGRESS NOTES
Subjective:   Tony Crum is a 74 year old male who presents for a Subsequent Annual Wellness visit (Pt already had Initial Annual Wellness) and scheduled follow up of multiple significant but stable problems.               Patient has a past medical history of coronary artery disease hypercholesterolemia he does follow-up with cardiology Dr Moura He also has a history of prostate cancer at this time he is currently in remission he has seen Dr. Huynh for urology        History/Other:   Fall Risk Assessment:   He has been screened for Falls and is low risk.      Cognitive Assessment:   He had a completely normal cognitive assessment - see flowsheet entries     Functional Ability/Status:   Tony Crum has some abnormal functions as listed below:  He has Hearing problems based on screening of functional status.      Depression Screening (PHQ):         Advanced Directives:   He does have a Living Will but we do NOT have it on file in Epic.    He does have a POA but we do NOT have it on file in Epic.    Discussed Advance Care Planning with patient (and family/surrogate if present). Standard forms made available to patient in After Visit Summary.      Problem List[1]  Allergies:  He is allergic to morphine and sulfa drugs cross reactors.    Current Medications:  Active Meds, Sig Only[2]    Medical History:  He  has a past medical history of Adenomatous colon polyp (7/17, 10/12, 12/06), Malignant neoplasm of prostate (HCC) (06/29/2012), Prostate cancer (HCC) (2001), and Prostate cancer (HCC) (04/10/2002).  Surgical History:  He  has a past surgical history that includes cabg (2001); inner ear surgery proc unlisted (90); sinus surgery   (97); other surgical history (01); vasectomy (96); tonsillectomy (56); colonoscopy (7/17, 10/12, 12/06, 1/01); cataract (Right, 10/2017); and cataract (Left, 11/2017).   Family History:  His family history includes Heart Attack in his father; Hypertension in his  mother.  Social History:  He  reports that he has never smoked. He has never used smokeless tobacco. He reports current alcohol use of about 1.7 standard drinks of alcohol per week. He reports that he does not use drugs.    Tobacco:  He has never smoked tobacco.    CAGE Alcohol Screen:   CAGE screening score of 0 on 7/2/2025, showing low risk of alcohol abuse.      Patient Care Team:  Levi Chin MD as PCP - General  Jay Orta MD (GASTROENTEROLOGY)    Review of Systems  Consitutional: No fevers, chills, sweats  Eye: No recent visual problems  ENMT: No ear pain nasal congestion sore throat  Respiratory: No shortness of breath, cough  Cardiovascular: No chest pain palpitations syncope  Gastrointestinal: No nausea vomiting diarrhea  Genitourinary: No hematuria  Hema/Lymph no bruising tendency, swollen lymph glands  Endocrine: Negative for excessive thirst excessive hunger  Musculoskeletal: No back pain neck pain joint pain muscle pain decreased range of motion  Integumentary: No rash, pruritus, abrasions  Neurologic: Alert and oriented x4  Psychiatric: No anxiety, depression    Medical, surgical, family, and social histories were reviewed      Objective:   Physical Exam    VITALS: Vitals reviewed   GENERAL: well developed, well nourished, in no apparent distress  SKIN: no rashes, no suspicious lesions: Cool and Dry  HEENT: atraumatic, normocephalic, ears and throat are clear bilateral tympanic membranes lucent nonbulging intact nose without bleeding purulent discharge or septal hematoma.    EYES: Pupils equal round and reactive.  Extraocular motions intact no scleral icterus no injection or drainage  THROAT without erythema tonsillar hypertrophy or exudate.  Uvula midline airway patent                Hearing Assessed via: Whispered Voice  NECK: trachea midline.  No JVD or lymphadenopathy supple nontender no meningeal signs   LUNGS: clear to auscultation sounds equal bilaterally no wheezes rales or  rhonchi  CARDIO: Regular rate and rhythm without murmurs gallops or rubs  GI: BS + Soft nontender nondistended no hepatomegaly palpable masses.  No guarding.  BACK non tender without deformity or crepitus no flank tenderness  EXTREMITIES: no cyanosis, clubbing or edema no joint tenderness effusion or edema noted.  No calf tenderness negative Homans' sign bilaterally  NEURO: Awake and alert.  Cranial nerves II through XII intact motor and sensory grossly within normal limits.  5 out of 5 muscle strength in all muscle groups.  Normal speech.  PSYCHIATRIC: Awake, alert and oriented x3, cooperative appropriate mood and affect.  Judgment intact          /70 (BP Location: Left arm, Patient Position: Sitting, Cuff Size: adult)   Pulse 67   Temp 98.2 °F (36.8 °C) (Temporal)   Resp 18   Ht 5' 8.5\" (1.74 m)   Wt 184 lb (83.5 kg)   BMI 27.57 kg/m²  Estimated body mass index is 27.57 kg/m² as calculated from the following:    Height as of this encounter: 5' 8.5\" (1.74 m).    Weight as of this encounter: 184 lb (83.5 kg).    Medicare Hearing Assessment:   Hearing Screening    Time taken: 7/9/2025  2:33 PM  Entry User: Brady Avery MA  Screening Method: Finger Rub  Finger Rub Result: Pass             Assessment & Plan:   Tony Crum is a 74 year old male who presents for a Medicare Assessment.     1. Medicare annual wellness visit, subsequent (Primary)  I did discuss with the patient to consider obtaining an RSV vaccine he is up-to-date with all other preventative measures he was asked to follow-up yearly for regular physical exams        2. Prostate cancer (HCC)  Patient at this time has been in remission he has been following up with urology he was asked to continue to do so      3. Pure hypercholesterolemia        Patient is currently following up with cardiology recently did have blood work completed LDL was well-controlled he was asked to continue with atorvastatin as well as to follow-up with  cardiology      The patient presented today for Medicare annual wellness visit.  During the course of today's visit the health risk assessment past medical family and social histories were updated and reviewed with the patient.  The patient was educated and counseled about appropriate screening and preventative services and these were ordered as appropriate.  Referrals for educational and counseling services were made where indicated.  Advance directives were discussed.  A copy of the recommended preventative screenings as well as discharge instructions were provided to the patient.  End-of-life planning was discussed advanced directives were also discussed and are in place.      The patient indicates understanding of these issues and agrees to the plan.  Patient reassured.  Reinforced healthy diet, lifestyle, and exercise.      No follow-ups on file.     Levi Chin MD, 7/9/2025     Supplementary Documentation:   General Health:  In the past six months, have you lost more than 10 pounds without trying?: (Patient-Rptd) 1 - Yes  Has your appetite been poor?: (Patient-Rptd) No  Type of Diet: (Patient-Rptd) Balanced  How does the patient maintain a good energy level?: (Patient-Rptd) Appropriate Exercise  How would you describe your daily physical activity?: (Patient-Rptd) Moderate  How would you describe your current health state?: (Patient-Rptd) Good  How do you maintain positive mental well-being?: (Patient-Rptd) Social Interaction, Games, Visiting Friends, Visiting Family  On a scale of 0 to 10, with 0 being no pain and 10 being severe pain, what is your pain level?: (Patient-Rptd) 2 - (Mild)  In the past six months, have you experienced urine leakage?: (Patient-Rptd) 0-No  At any time do you feel concerned for the safety/well-being of yourself and/or your children, in your home or elsewhere?: (Patient-Rptd) No  Have you had any immunizations at another office such as Influenza, Hepatitis B, Tetanus, or  Pneumococcal?: (Patient-Rptd) No    Health Maintenance   Topic Date Due    COVID-19 Vaccine (1 - 2024-25 season) Never done    Annual Depression Screening  01/01/2025    Annual Physical  06/28/2025    Influenza Vaccine (1) 10/01/2025    PSA  05/27/2027    Colorectal Cancer Screening  08/03/2029    Fall Risk Screening (Annual)  Completed    Pneumococcal Vaccine: 50+ Years  Completed    Zoster Vaccines  Completed    Meningococcal B Vaccine  Aged Out            [1]   Patient Active Problem List  Diagnosis    Screening for genitourinary condition    Screening for iron deficiency anemia    Carbuncle and furuncle of unspecified site    Encounter for long-term (current) use of other medications    Coronary atherosclerosis    Pure hypercholesterolemia    Annual physical exam    ED (erectile dysfunction)    Asymmetric SNHL (sensorineural hearing loss)    CAD in native artery    Achilles tendinitis of both lower extremities    Closed fracture of distal phalanx or phalanges of hand    Contracture of both Achilles tendons    Finger pain, right    Mobitz type 1 second degree AV block    Posterior calcaneal exostosis    Arteriosclerosis of coronary artery    Calcaneal spur, right foot    Tenderness of right knee    Prostate cancer (HCC)   [2]   Outpatient Medications Marked as Taking for the 7/9/25 encounter (Office Visit) with Levi Chin MD   Medication Sig    VEVYE 0.1 % Ophthalmic Solution INSTILL ONE DROP IN BOTH EYE(S) TWICE DAILY    fluticasone propionate 50 MCG/ACT Nasal Suspension 1 spray by Each Nare route in the morning and 1 spray before bedtime.    valACYclovir 1 G Oral Tab Take 1 tablet (1,000 mg total) by mouth every 12 (twelve) hours.    triamcinolone 0.1 % External Lotion Apply topically 2 (two) times daily.    Insulin Syringe-Needle U-100 (BD INSULIN SYRINGE U/F) 31G X 5/16\" 1 ML Does not apply Misc To be use for Trimix injection    Multiple Vitamins-Iron (ONE DAILY MULTIVITAMIN/IRON) Oral Tab Take by  mouth.    PREVIDENT 5000 BOOSTER PLUS 1.1 % Dental Paste USE 1-2 TIMES DAILY IN PLACE OF REGULAR TOOTHPASTE. SPIT OUT & DO NOT RINSE. NO FOOD/DRINK X30 MIN    Metoprolol Succinate ER 25 MG Oral Tablet 24 Hr Take 1 tablet (25 mg total) by mouth daily.    TURMERIC OR Take 1 capsule by mouth daily.    Atorvastatin Calcium 20 MG Oral Tab Take 1 tablet (20 mg total) by mouth daily.    MetroNIDAZOLE 1 % External Gel Use as directed    omega-3 fatty acids (FISH OIL) 1000 MG Oral Cap Take 4,000 mg by mouth daily.    BABY ASPIRIN OR Take 1 tablet by mouth.    Cholecalciferol (VITAMIN D-3) 5000 UNITS Oral Tab Take 1 tablet (5,000 Units total) by mouth.

## 2025-07-16 ENCOUNTER — APPOINTMENT (OUTPATIENT)
Dept: URBAN - METROPOLITAN AREA CLINIC 249 | Age: 75
Setting detail: DERMATOLOGY
End: 2025-07-21

## 2025-07-16 DIAGNOSIS — D49.2 NEOPLASM OF UNSPECIFIED BEHAVIOR OF BONE, SOFT TISSUE, AND SKIN: ICD-10-CM

## 2025-07-16 DIAGNOSIS — L73.9 FOLLICULAR DISORDER, UNSPECIFIED: ICD-10-CM

## 2025-07-16 DIAGNOSIS — L71.8 OTHER ROSACEA: ICD-10-CM

## 2025-07-16 DIAGNOSIS — L82.1 OTHER SEBORRHEIC KERATOSIS: ICD-10-CM

## 2025-07-16 DIAGNOSIS — L91.0 HYPERTROPHIC SCAR: ICD-10-CM

## 2025-07-16 PROCEDURE — OTHER OTHER: OTHER

## 2025-07-16 PROCEDURE — OTHER PRESCRIPTION: OTHER

## 2025-07-16 PROCEDURE — 99214 OFFICE O/P EST MOD 30 MIN: CPT

## 2025-07-16 PROCEDURE — OTHER PRESCRIPTION MEDICATION MANAGEMENT: OTHER

## 2025-07-16 PROCEDURE — OTHER DEFER: OTHER

## 2025-07-16 PROCEDURE — OTHER MIPS QUALITY: OTHER

## 2025-07-16 PROCEDURE — OTHER COUNSELING: OTHER

## 2025-07-16 RX ORDER — METRONIDAZOLE 7.5 MG/G
CREAM TOPICAL
Qty: 45 | Refills: 11 | Status: ERX | COMMUNITY
Start: 2025-07-16

## 2025-07-16 ASSESSMENT — LOCATION SIMPLE DESCRIPTION DERM
LOCATION SIMPLE: POSTERIOR SCALP
LOCATION SIMPLE: LEFT CHEEK
LOCATION SIMPLE: ABDOMEN
LOCATION SIMPLE: RIGHT THIGH
LOCATION SIMPLE: LEFT THIGH
LOCATION SIMPLE: RIGHT UPPER BACK
LOCATION SIMPLE: CHEST
LOCATION SIMPLE: RIGHT CHEEK

## 2025-07-16 ASSESSMENT — LOCATION DETAILED DESCRIPTION DERM
LOCATION DETAILED: RIGHT SUPERIOR MEDIAL UPPER BACK
LOCATION DETAILED: RIGHT CENTRAL MALAR CHEEK
LOCATION DETAILED: RIGHT ANTERIOR DISTAL THIGH
LOCATION DETAILED: LEFT CENTRAL MALAR CHEEK
LOCATION DETAILED: MID-OCCIPITAL SCALP
LOCATION DETAILED: RIGHT MEDIAL INFERIOR CHEST
LOCATION DETAILED: LEFT ANTERIOR DISTAL THIGH
LOCATION DETAILED: PERIUMBILICAL SKIN

## 2025-07-16 ASSESSMENT — SEVERITY ASSESSMENT OVERALL AMONG ALL PATIENTS: IN YOUR EXPERIENCE, AMONG ALL PATIENTS YOU HAVE SEEN WITH THIS CONDITION, HOW SEVERE IS THIS PATIENT'S CONDITION?: MILD

## 2025-07-16 ASSESSMENT — LOCATION ZONE DERM
LOCATION ZONE: LEG
LOCATION ZONE: TRUNK
LOCATION ZONE: SCALP
LOCATION ZONE: FACE

## 2025-07-18 ENCOUNTER — RX ONLY (RX ONLY)
Age: 75
End: 2025-07-18

## 2025-07-18 RX ORDER — METRONIDAZOLE 7.5 MG/G
GEL TOPICAL
Qty: 45 | Refills: 11 | Status: ERX | COMMUNITY
Start: 2025-07-18

## (undated) NOTE — LETTER
[address] [facility], [id number] [place] [address] [id number]         September 12, 2017        [facility] [place] [id number]      Dear Brda Delvalle:     Our records indicate that y